# Patient Record
Sex: MALE | Race: BLACK OR AFRICAN AMERICAN | Employment: OTHER | URBAN - METROPOLITAN AREA
[De-identification: names, ages, dates, MRNs, and addresses within clinical notes are randomized per-mention and may not be internally consistent; named-entity substitution may affect disease eponyms.]

---

## 2017-01-23 ENCOUNTER — IP HISTORICAL/CONVERTED ENCOUNTER (OUTPATIENT)
Dept: OTHER | Age: 52
End: 2017-01-23

## 2017-05-16 ENCOUNTER — IP HISTORICAL/CONVERTED ENCOUNTER (OUTPATIENT)
Dept: OTHER | Age: 52
End: 2017-05-16

## 2017-12-31 ENCOUNTER — IP HISTORICAL/CONVERTED ENCOUNTER (OUTPATIENT)
Dept: OTHER | Age: 52
End: 2017-12-31

## 2018-01-29 ENCOUNTER — IP HISTORICAL/CONVERTED ENCOUNTER (OUTPATIENT)
Dept: OTHER | Age: 53
End: 2018-01-29

## 2018-03-21 ENCOUNTER — IP HISTORICAL/CONVERTED ENCOUNTER (OUTPATIENT)
Dept: OTHER | Age: 53
End: 2018-03-21

## 2018-03-27 ENCOUNTER — IP HISTORICAL/CONVERTED ENCOUNTER (OUTPATIENT)
Dept: OTHER | Age: 53
End: 2018-03-27

## 2018-05-18 ENCOUNTER — IP HISTORICAL/CONVERTED ENCOUNTER (OUTPATIENT)
Dept: OTHER | Age: 53
End: 2018-05-18

## 2018-10-13 ENCOUNTER — ED HISTORICAL/CONVERTED ENCOUNTER (OUTPATIENT)
Dept: OTHER | Age: 53
End: 2018-10-13

## 2018-11-23 ENCOUNTER — APPOINTMENT (OUTPATIENT)
Dept: GENERAL RADIOLOGY | Age: 53
End: 2018-11-23
Attending: EMERGENCY MEDICINE
Payer: MEDICAID

## 2018-11-23 ENCOUNTER — HOSPITAL ENCOUNTER (EMERGENCY)
Age: 53
Discharge: HOME | End: 2018-11-23
Attending: EMERGENCY MEDICINE
Payer: MEDICAID

## 2018-11-23 VITALS
RESPIRATION RATE: 18 BRPM | BODY MASS INDEX: 26.46 KG/M2 | TEMPERATURE: 98.2 F | HEIGHT: 71 IN | OXYGEN SATURATION: 98 % | DIASTOLIC BLOOD PRESSURE: 112 MMHG | HEART RATE: 91 BPM | SYSTOLIC BLOOD PRESSURE: 192 MMHG | WEIGHT: 189 LBS

## 2018-11-23 DIAGNOSIS — Z99.2 ACUTE HEMODIALYSIS PATIENT (HCC): Primary | ICD-10-CM

## 2018-11-23 DIAGNOSIS — J90 PLEURAL EFFUSION ON RIGHT: ICD-10-CM

## 2018-11-23 DIAGNOSIS — I15.9 SECONDARY HYPERTENSION: ICD-10-CM

## 2018-11-23 LAB
ANION GAP BLD CALC-SCNC: 16 MMOL/L (ref 10–20)
ANION GAP SERPL CALC-SCNC: 7 MMOL/L (ref 3–18)
BASOPHILS # BLD: 0 K/UL (ref 0–0.1)
BASOPHILS NFR BLD: 0 % (ref 0–2)
BUN BLD-MCNC: 36 MG/DL (ref 7–18)
BUN SERPL-MCNC: 43 MG/DL (ref 7–18)
BUN/CREAT SERPL: 10 (ref 12–20)
CA-I BLD-MCNC: 1.07 MMOL/L (ref 1.12–1.32)
CALCIUM SERPL-MCNC: 8.4 MG/DL (ref 8.5–10.1)
CHLORIDE BLD-SCNC: 108 MMOL/L (ref 100–108)
CHLORIDE SERPL-SCNC: 109 MMOL/L (ref 100–108)
CO2 BLD-SCNC: 22 MMOL/L (ref 19–24)
CO2 SERPL-SCNC: 25 MMOL/L (ref 21–32)
CREAT SERPL-MCNC: 4.25 MG/DL (ref 0.6–1.3)
CREAT UR-MCNC: 4.3 MG/DL (ref 0.6–1.3)
DIFFERENTIAL METHOD BLD: ABNORMAL
EOSINOPHIL # BLD: 0.4 K/UL (ref 0–0.4)
EOSINOPHIL NFR BLD: 4 % (ref 0–5)
ERYTHROCYTE [DISTWIDTH] IN BLOOD BY AUTOMATED COUNT: 15.3 % (ref 11.6–14.5)
GLUCOSE BLD STRIP.AUTO-MCNC: 140 MG/DL (ref 74–106)
GLUCOSE SERPL-MCNC: 105 MG/DL (ref 74–99)
HBV SURFACE AG SER QL: 0.36 INDEX
HBV SURFACE AG SER QL: NEGATIVE
HCT VFR BLD AUTO: 27 % (ref 36–48)
HCT VFR BLD CALC: 29 % (ref 36–49)
HGB BLD-MCNC: 9.1 G/DL (ref 13–16)
HGB BLD-MCNC: 9.9 G/DL (ref 12–16)
LYMPHOCYTES # BLD: 1 K/UL (ref 0.9–3.6)
LYMPHOCYTES NFR BLD: 11 % (ref 21–52)
MAGNESIUM SERPL-MCNC: 2.1 MG/DL (ref 1.6–2.6)
MCH RBC QN AUTO: 27.7 PG (ref 24–34)
MCHC RBC AUTO-ENTMCNC: 33.7 G/DL (ref 31–37)
MCV RBC AUTO: 82.1 FL (ref 74–97)
MONOCYTES # BLD: 0.6 K/UL (ref 0.05–1.2)
MONOCYTES NFR BLD: 7 % (ref 3–10)
NEUTS SEG # BLD: 6.8 K/UL (ref 1.8–8)
NEUTS SEG NFR BLD: 78 % (ref 40–73)
PLATELET # BLD AUTO: 329 K/UL (ref 135–420)
PMV BLD AUTO: 9.9 FL (ref 9.2–11.8)
POTASSIUM BLD-SCNC: 3.7 MMOL/L (ref 3.5–5.5)
POTASSIUM SERPL-SCNC: 4.1 MMOL/L (ref 3.5–5.5)
RBC # BLD AUTO: 3.29 M/UL (ref 4.7–5.5)
SODIUM BLD-SCNC: 142 MMOL/L (ref 136–145)
SODIUM SERPL-SCNC: 141 MMOL/L (ref 136–145)
WBC # BLD AUTO: 8.8 K/UL (ref 4.6–13.2)

## 2018-11-23 PROCEDURE — 80048 BASIC METABOLIC PNL TOTAL CA: CPT

## 2018-11-23 PROCEDURE — 93005 ELECTROCARDIOGRAM TRACING: CPT

## 2018-11-23 PROCEDURE — 86706 HEP B SURFACE ANTIBODY: CPT

## 2018-11-23 PROCEDURE — 80047 BASIC METABLC PNL IONIZED CA: CPT

## 2018-11-23 PROCEDURE — 83735 ASSAY OF MAGNESIUM: CPT

## 2018-11-23 PROCEDURE — 90935 HEMODIALYSIS ONE EVALUATION: CPT

## 2018-11-23 PROCEDURE — 71045 X-RAY EXAM CHEST 1 VIEW: CPT

## 2018-11-23 PROCEDURE — 99284 EMERGENCY DEPT VISIT MOD MDM: CPT

## 2018-11-23 PROCEDURE — 85025 COMPLETE CBC W/AUTO DIFF WBC: CPT

## 2018-11-23 PROCEDURE — 87340 HEPATITIS B SURFACE AG IA: CPT

## 2018-11-23 NOTE — PROGRESS NOTES
. 
Acute HEmodialysis flow sheet Patient information Name:Rogerio NOVAK [employer] MRN: [id number]      SQV:[id number]  [place] Room#H1/A Hospital: [place] [address] DX: needs dialysis [x]1st Time Acute  []Stat[x] Routine []Urgent []Chronic Unit  
       []Acute Room []Bedside  []ICU/CCU []ER Isolation Precautions: [x]Dialysis[] Airborne []Contact []Droplet []Reverse Special Considerations:    [] Blood Consent Verified  []N/A Allergies: 
[x] NKA  [] Reviewed No Known Allergies Code Status [x]Full Code [] DNR  []Other Diet:  Diabetic: []Yes [x]No 
  
 Hemodialysis Orders Physician: Sophie Camacho Dialyzer Revaclear 300 Duration 3 BFR: 350 DFR:800 Dialysate: K 3 CA 2.5 Na 140 Bicarb 35 Dry Weight: UF Goal: 2000 ml Heparin:  []Bolus   Units    []Hourly  Units      [x]None BP Tx:  []NS  200ml/Bolus    []Other     [x]N/A  
    Labs: Pre:  Post: [x]N/A Additional Orders: [x]N/A [x]Signed Treatment Consent   [x] Verified   [] Obtained Primary Nurse Report: First initial/ Last Name/Title Pre Dialysis: Lilliana Jimenes    Time: 6896 Access CATHETER ACCESS:  [] N/A  [x] RIGHT  [] LEFT  [] IJ  [x] SUBCL [] FEM [] First use X-ray  [] Tunnel     [] Non-Tunneled [x] No S/S infection  [] Redness [] Drainage  [] Cultured [] Swelling 
                       [] Pain  
                       [x] Medical Aseptic [] Prep Dressing Changed  
                       [] Clotted [] Patent []      Flows: [x] Good [] Poor [] Reversed If Access Problem Dr. Barbie Hope: [] Yes [] No    Date:    [x] N/A  
     GRAFT/FISTULA ACCESS:  [x] N/A  [] RIGHT  [] LEFT  [] UE  [] LE   
                       [] AVG  [] AVF [] BUTTONHOLE  [] +BRUIT/THRILL 
     [] MEDICAL ASEPTIC PREP [] No S/S infection  [] Redness [] Drainage  [] Cultured [] Swelling [] Pain Needle Gauge                              Length: If Access Problem Dr. Cunningham Guard: [] Yes [] No    Date:     [] N/A General Assessment LUNGS:  SaO2%  [x] Clear [] Coarse [] Crackles [] Wheezing  
            [] Diminished Location: [] RLL [] LLL [] RUL [] HANSA   
     COUGH:  [] Productive [] Dry [x] N/A  RESPIRATIONS: [x] Easy [] Labored THERAPY: [x] RA   [] NC     L/min    Mask: [] NRB [] Venti  O2%    
             [] Ventilator [] Intubated [] Trach [] BiPap [] CPap CARDIAC: [x] Regular [] Irregular [] Pericardial Rub [] JVD [] Monitored Rhythm: EDEMA: [x] None [] Generalized [] Facial [] Pedal [] UE [] LE 
           [] Pitting [] 1 [] 2 [] 3 [] 4    [] Right [] Left [] Bilateral  
    SKIN:    [x] Warm [] Hot [] Cold  [] Dry [] Pale [] Diaphoretic  
           [] Flushed [] Jaundiced [] Cyanotic [] Rash [] Weeping LOC:    [x] Alert  Oriented to: [x] Person [x] Place [x] Time  
          [] Confused [] Lethargic [] Medically sedated [] Non-responsive GI/ABDOMEN: [] Flat [x] Distended [x] Soft [] Firm [] Obese [] Diarrhea 
 [] Bowel Sounds     []Nausea [] Vomiting PAIN: [x] 0 [] 1 [] 2 [] 3 [] 4 [] 5 [] 6 [] 7 [] 8 [] 9 [] 10 Scale 1-10 Action/Follow Up MOBILITY: [] Amb [] Amb/Assist [x] Bed  [] Wheelchair CUrrent LABS HBsAg ONLY: Date Drawn  [] Negative [] Positive  [x] Unknown. HBsAb: Date Drawn  [] Susceptible <10 [] Immune ? 10 [x] Unknown Date of Current Labs:  
    
Lab Results Component Value Date/Time  Sodium 141 11/23/2018 02:30 PM  
 Potassium 4.1 11/23/2018 02:30 PM  
 Chloride 109 (H) 11/23/2018 02:30 PM  
 CO2 25 11/23/2018 02:30 PM  
 BUN 43 (H) 11/23/2018 02:30 PM  
 Creatinine 4.25 (H) 11/23/2018 02:30 PM  
 Calcium 8.4 (L) 11/23/2018 02:30 PM  
 Magnesium 2.1 11/23/2018 02:30 PM  
 HCT 27.0 (L) 11/23/2018 02:30 PM  
 HGB 9.1 (L) 11/23/2018 02:30 PM  
 PLATELET 000 75/85/7662 02:30 PM  
 INR 1.1 05/17/2011 12:02 AM  
  
Education Person Educated: [x] Patient [] Family [] Other Knowledge base: [] None [] Minimal [] Substantial   
     Barriers to learning  [x] None Preferred method of learning: [] Written [x] Oral [] Visual [] Hands on Topic: [] Access Care [] S&S of infection [] Fluid Management [] K+ 
               [x] Procedural    [] Albumin [] Medications [] Tx Options [] Transplant 
                [] Diet [] Other Teaching Tools: [x] Explain [] Demonstration [] Handout [] Teachback Ro/hemodiaylsis machine safety checks- before each treatment Machine Serial #11 [] # 1 [id number] [] # 2 [id number] [] # 3 [id number] [] # 4 [id number] [] # 5 [id number] [] # [phone] [] # [phone] Alarm Test: [x] Pass Time 8952       RO Serial # 11 
[] [id number] (Large dual station RO) [] # 1  [id number]  (Portable # 1) [] # 2  [id number]  (Portable # 2) [] # 3  [id number]  (Portable # 3) [] # 4  [id number]  (Portable # 4) [] # 5  [id number]  (Portable # 5) Lot #s: Dialyzer [id number] exp. Tubing [id number] exp. Saline -jt exp. [x] RO/Machine Log Complete    [x] Extracorporeal circuit Tested for integrity Dialysate: pH 7.4 Temp. 36  Conductivity: Meter 14 HD Machine 14  
chlorine testing- before each treatment and every 4 hours Total Chlorine: [x] Less than 0.1 ppm Time: 1545 2nd Check Time:  
(If greater than 0.1 ppm from Primary then every 30 minutes from Secondary) treatment Iniation-with dialysis precautions [x] All Connections Secured   [x] Saline Line Double Clamped    
[x] Venous Parameters Set    [x] Arterial Parameters Set    [x] Prime Given 250 ml            [x] Air Foam Detector Engaged Cooper Nurse Signature/Title_Philip P Long__ Pre-Treatment Time 1545 B/P 172/103 HR 93 Temp. 98.2 Resp Rate 18 Pre Wt  
UF Calculations:   
Wt to lose:2000 ml(+) Oral:  ml(+) IV Meds/Fluids/Blood prods  ml(+) Prime/Rinse 500 ml 
(=)Total UF Goal 2500 mL Scale Type:[] Bed scale [] Sling Scale [] Wheel Chair Scale [x]  Not Ordered [] Unable to obtain pt on stretcher Tx Initiation Note: right IJ catheter accessed and treatment started. [x] Time Out/Safety Check  Time: 8105 [facility] Signature/Title_Devonte P Mane INTRADIALYTIC MONITORING 
(SEE ATTACHED FLOWSHEET) POST TREATMENT Time 1915 B/P 192/112 HR 91 Temp 98.2 Resp. Rate 18 Post wt Time Medication Dose Volume Route Initials [facility] Signatures Title Initials Time 52 [facility] RN [address] 0172 [facility] Signature/Title:_Devonte DEVRIES Long_ Dialyzer cleared: [] Good [] Fair [] Poor    Blood Processed 60 Liters Net UF Removed 3000 mL Post Tx Access: AVF/AVG: Bleeding Stop                   Art. min Varghese min []+bruit/thrill Catheter: Locking Solution hejparin     Art. 1.6 ml Varghese 1.6 ml 
 
Post Assessment:  Skin: [x]Warm []Dry []Diaphoretic []Flushed [] Pale []Cyanotic Lungs: [x]Clear []Coarse []Crackles []Wheezing Cardiac: [x]Regular []Irregular  []Monitored rhythm Edema: [x]None []General []Facial []Pedal  []UE []LE []RIGHT []LEFT    []Bilateral  
   Pain: [x]0 []1[]2 []3 []4 []5 []6 []7 []8 []9 []10  
POST Tx Note: removed 3 liters patient tolerated treatment returned to ED Primary Nurse Report: First initial/Last name/Title Post Dialysis:PORTER Hernandez RN_         Time:_1915_ Abbreviations: AVG-arterial venous graft, AVF-arterial venous fistula, IJ-Internal Jugular, Subcl-Subclavian, Fem-Femoral, Tx-treatment, AP/HR-apical heart rate, DFR-dialysate flow rate, BFR-blood flow rate, AP-arterial pressure, -venous pressure, UF-ultrafiltrate, TMP-transmembrane pressure, Varghese-Venous, Art-Arterial, RO-Reverse Osmosis

## 2018-11-23 NOTE — ANCILLARY DISCHARGE INSTRUCTIONS
Dr. Merlene Aden spoke with Dr. Ced Arcos,  Office will follow up with patient, if unable to schedule follow up by Monday, patient is to return to the ED on Monday 11/26/18 for dialysis. Patient is aware.

## 2018-11-23 NOTE — ED TRIAGE NOTES
Patient states he was released from incarceration on 11/5, has not been set up with a dialysis center yet, was at [facility] on Wednesday for dialysis and was told to go to [facility] on Friday as this is closer to his home, patient needs follow up for dialysis placement

## 2018-11-23 NOTE — DISCHARGE INSTRUCTIONS
Kidney Dialysis: Care Instructions  Your Care Instructions    Dialysis is a process that filters wastes from the blood when your kidneys can no longer do the job. It is not a cure, but it can help you live longer and feel better. It is a lifesaving treatment when you have kidney failure. Normal kidneys work 24 hours a day to clean wastes from your blood. Your kidneys are not able to do this job, so a process called dialysis will do some of the work for your kidneys. You and your doctor will decide which type of dialysis you should have. Peritoneal dialysis uses the lining of your belly (peritoneum) to filter your blood. You can do it at home, on a daily basis. Hemodialysis uses a man-made filter called a dialyzer to clean your blood. Most people need to go to a hospital or clinic 3 days a week for several hours each time. Sometimes hemodialysis can be done at home. It is normal to have questions about your treatment, and you have a right to know what is happening to you. Learning about dialysis can help you take an active role in your treatment. Dialysis does not cure kidney disease, but it can help you live longer and feel better. You will need to follow your diet and treatment schedule carefully. Follow-up care is a key part of your treatment and safety. Be sure to make and go to all appointments, and call your doctor if you are having problems. It's also a good idea to know your test results and keep a list of the medicines you take. What do you need to know about peritoneal dialysis? Peritoneal dialysis uses the lining of your belly (or peritoneal membrane) to filter your blood. Before you can begin peritoneal dialysis, your doctor will need to place a thin tube called a catheter in your belly. This is the dialysis access. · Peritoneal dialysis can be done at home or in any clean place. You may be able to do it while you sleep. · You can do it by yourself.  You do not have to rely on help from others. · You can do it at the times you choose as long as you do the right number of treatments. · It has to be done every day of the week. · Some people find it hard to do all the required steps. · It increases your chance for a serious infection of the lining of the belly (peritoneum). What do you need to know about hemodialysis? Hemodialysis uses a man-made membrane called a dialyzer to clean your blood. You are connected to the dialyzer by tubes attached to your blood vessels. Before you start hemodialysis, your doctor will create a site where the blood can flow in and out of your body during your dialysis sessions. This site is called the vascular access. It may be a fistula, made by connecting an artery and a vein. Or it may be a graft, which is a tube implanted under your skin. · Hemodialysis is done mainly by trained health workers who can watch for any problems. · It allows you to be in contact with other people having dialysis. This can help provide emotional support. · You can schedule your treatments in the evenings so you can keep working. · You may be able to do home hemodialysis, which gives you more control over your schedule. · It usually needs to be done on a set schedule 3 times a week. · It can cause side effects. The most common side effects are low blood pressure and muscle cramps. These can often be treated easily. · It requires needle sticks during every treatment, which bothers some people. Others get used to it and even do the needle sticks themselves. How can you care for yourself at home? · Be sure to have all of your dialysis sessions. Do not try to shorten or skip your sessions. You have a better chance of a longer and healthier life by getting your full treatment. · Your doctor or health care team will show you the steps you need to go through each day before, during, and after dialysis. Be sure to follow these steps.  If you do not understand a step, talk to your team.  · Your doctor and dietitian will help you design menus that follow your diet. Be sure to follow your diet guidelines. ? You will need to limit fluids and certain foods that contain salt (sodium), potassium, and phosphorus. ? You may need to follow a heart-healthy diet to keep the fat (cholesterol) in your blood under control. ? You may need higher levels of protein in your diet. · Your doctor may recommend certain vitamins. But do not take any other medicine, including over-the-counter medicines, vitamins, and herbal products, without talking to your doctor first.  · Do not smoke. Smoking raises your risk of many health problems, including more kidney damage. If you need help quitting, talk to your doctor about stop-smoking programs and medicines. These can increase your chances of quitting for good. · Do not take ibuprofen (Advil, Motrin), naproxen (Aleve), or similar medicines, unless your doctor tells you to. These medicines may make kidney problems worse. When should you call for help? Call your doctor now or seek immediate medical care if:    · You have a fever.     · You are dizzy or lightheaded, or you feel like you may faint.     · You are confused or cannot think clearly.     · You have new or worse nausea or vomiting.     · You have new or more blood in your urine.     · You have new swelling.    Watch closely for changes in your health, and be sure to contact your doctor if:    · You do not get better as expected. Where can you learn more? Go to http://nato-kenneth.info/. Enter L968 in the search box to learn more about \"Kidney Dialysis: Care Instructions. \"  Current as of: March 15, 2018  Content Version: 11.8  © 1399-6375 [employer]. Care instructions adapted under license by [employer] (which disclaims liability or warranty for this information).  If you have questions about a medical condition or this instruction, always ask your healthcare professional. [address] any warranty or liability for your use of this information. Learning About a Pleural Effusion  What is it? A pleural effusion (say \"PLER-jose jq-ZVJM-rwui\") is the buildup of fluid in the space between tissues lining the lungs and the chest wall. Because of the fluid buildup, the lungs may not be able to expand completely. This can make it hard to breathe. A pleural empyema (say \"kt-vv-QF-muh\") is a problem that can happen with pleural effusion. Bacteria or other infections cause pus to form in the pleural fluid. But most pleural effusions don't become infected. What causes it? A pleural effusion has many causes. They include pneumonia, cancer, inflammation of the tissues around the lungs, and heart failure. What are the symptoms? Symptoms of a pleural effusion may include:  · Trouble breathing. · Shortness of breath. · Chest pain. · Fever. · A cough. A minor pleural effusion may not cause any symptoms. How is it diagnosed? A pleural effusion is usually diagnosed with an X-ray and a physical exam. The doctor listens to the airflow in your lungs. How is it treated? A pleural effusion can be treated by removing fluid from the space between the tissues around the lungs. This is done with a needle that's put into the chest (thoracentesis). A small amount of the fluid may be sent to a lab to find out what is causing the buildup of fluid. Removing the fluid may help to relieve symptoms, such as shortness of breath and chest pain. It can help the lungs to expand more fully. If the pleural effusion doesn't get better, a catheter may be placed in the chest. This is a flexible tube that allows fluid to drain from the lungs. The catheter stays in the chest until the doctor removes it. Some people may get a treatment that removes the fluid and then puts a medicine into the chest cavity. This helps to prevent too much fluid from building up again.   A minor pleural effusion often goes away on its own. Doctors may need to treat the condition that is causing the pleural effusion. For example, you may get medicines to treat pneumonia or congestive heart failure. When the condition is treated, the effusion usually goes away. For a pleural empyema, the pus needs to be drained. It may drain from a flexible tube placed in the chest. Or you may have surgery to drain it. You also will get antibiotics. Follow-up care is a key part of your treatment and safety. Be sure to make and go to all appointments, and call your doctor if you are having problems. It's also a good idea to know your test results and keep a list of the medicines you take. Where can you learn more? Go to http://nato-kenneth.info/. Enter A920 in the search box to learn more about \"Learning About a Pleural Effusion. \"  Current as of: June 18, 2018  Content Version: 11.8  © 6299-1954 [employer]. Care instructions adapted under license by [employer] (which disclaims liability or warranty for this information). If you have questions about a medical condition or this instruction, always ask your healthcare professional. [place] [address] any warranty or liability for your use of this information. Kidney Dialysis: Care Instructions  Your Care Instructions    Dialysis is a process that filters wastes from the blood when your kidneys can no longer do the job. It is not a cure, but it can help you live longer and feel better. It is a lifesaving treatment when you have kidney failure. Normal kidneys work 24 hours a day to clean wastes from your blood. Your kidneys are not able to do this job, so a process called dialysis will do some of the work for your kidneys. You and your doctor will decide which type of dialysis you should have. Peritoneal dialysis uses the lining of your belly (peritoneum) to filter your blood.  You can do it at home, on a daily basis. Hemodialysis uses a man-made filter called a dialyzer to clean your blood. Most people need to go to a hospital or clinic 3 days a week for several hours each time. Sometimes hemodialysis can be done at home. It is normal to have questions about your treatment, and you have a right to know what is happening to you. Learning about dialysis can help you take an active role in your treatment. Dialysis does not cure kidney disease, but it can help you live longer and feel better. You will need to follow your diet and treatment schedule carefully. Follow-up care is a key part of your treatment and safety. Be sure to make and go to all appointments, and call your doctor if you are having problems. It's also a good idea to know your test results and keep a list of the medicines you take. What do you need to know about peritoneal dialysis? Peritoneal dialysis uses the lining of your belly (or peritoneal membrane) to filter your blood. Before you can begin peritoneal dialysis, your doctor will need to place a thin tube called a catheter in your belly. This is the dialysis access. · Peritoneal dialysis can be done at home or in any clean place. You may be able to do it while you sleep. · You can do it by yourself. You do not have to rely on help from others. · You can do it at the times you choose as long as you do the right number of treatments. · It has to be done every day of the week. · Some people find it hard to do all the required steps. · It increases your chance for a serious infection of the lining of the belly (peritoneum). What do you need to know about hemodialysis? Hemodialysis uses a man-made membrane called a dialyzer to clean your blood. You are connected to the dialyzer by tubes attached to your blood vessels. Before you start hemodialysis, your doctor will create a site where the blood can flow in and out of your body during your dialysis sessions.  This site is called the vascular access. It may be a fistula, made by connecting an artery and a vein. Or it may be a graft, which is a tube implanted under your skin. · Hemodialysis is done mainly by trained health workers who can watch for any problems. · It allows you to be in contact with other people having dialysis. This can help provide emotional support. · You can schedule your treatments in the evenings so you can keep working. · You may be able to do home hemodialysis, which gives you more control over your schedule. · It usually needs to be done on a set schedule 3 times a week. · It can cause side effects. The most common side effects are low blood pressure and muscle cramps. These can often be treated easily. · It requires needle sticks during every treatment, which bothers some people. Others get used to it and even do the needle sticks themselves. How can you care for yourself at home? · Be sure to have all of your dialysis sessions. Do not try to shorten or skip your sessions. You have a better chance of a longer and healthier life by getting your full treatment. · Your doctor or health care team will show you the steps you need to go through each day before, during, and after dialysis. Be sure to follow these steps. If you do not understand a step, talk to your team.  · Your doctor and dietitian will help you design menus that follow your diet. Be sure to follow your diet guidelines. ? You will need to limit fluids and certain foods that contain salt (sodium), potassium, and phosphorus. ? You may need to follow a heart-healthy diet to keep the fat (cholesterol) in your blood under control. ? You may need higher levels of protein in your diet. · Your doctor may recommend certain vitamins. But do not take any other medicine, including over-the-counter medicines, vitamins, and herbal products, without talking to your doctor first.  · Do not smoke.  Smoking raises your risk of many health problems, including more kidney damage. If you need help quitting, talk to your doctor about stop-smoking programs and medicines. These can increase your chances of quitting for good. · Do not take ibuprofen (Advil, Motrin), naproxen (Aleve), or similar medicines, unless your doctor tells you to. These medicines may make kidney problems worse. When should you call for help? Call your doctor now or seek immediate medical care if:    · You have a fever.     · You are dizzy or lightheaded, or you feel like you may faint.     · You are confused or cannot think clearly.     · You have new or worse nausea or vomiting.     · You have new or more blood in your urine.     · You have new swelling.    Watch closely for changes in your health, and be sure to contact your doctor if:    · You do not get better as expected. Where can you learn more? Go to http://nato-kenneth.info/. Enter X389 in the search box to learn more about \"Kidney Dialysis: Care Instructions. \"  Current as of: March 15, 2018  Content Version: 11.8  © 7223-9819 Healthwise, Incorporated. Care instructions adapted under license by [employer] (which disclaims liability or warranty for this information). If you have questions about a medical condition or this instruction, always ask your healthcare professional. [address] any warranty or liability for your use of this information.

## 2018-11-23 NOTE — ED PROVIDER NOTES
EMERGENCY DEPARTMENT HISTORY AND PHYSICAL EXAM 
 
1:30 PM 
 
 
Date: 11/23/2018 Patient Name: Dalila Galvan History of Presenting Illness Chief Complaint Patient presents with  
 Other History Provided By: Patient Chief Complaint: Missed dialysis Duration:  Days Timing: progressive over 3 weeks Location: n/a Quality: n/a Severity: mild and patient is concerned of getting worse Modifying Factors: No modifying or aggravating factors were reported. Associated Symptoms: denies any other associated signs or symptoms Additional History (Context): 1:35 PM Dalila Galvan is a 47 y.o. male with h/o diabetes, HTN, CAD, multiple stents, multiple alvarado, and multiple heart attacks who presents to ED for evaluation after he has missed dialysis. He was incarcirated for three years and released on Nov 5. He missed dialysis for two weeks, and was dialysed two days ago at [facility]. He presents to be sure that he does not need another treatment. He has been on dialysis for 11 months. He denies any current symptoms. Occasionally smokes, denies etoh use. He has chronic right sided weakness after a stroke deficit. No modifying or aggravating factors were reported. No other concerns or symptoms at this time. PCP: Hector Walden MD 
 
Current Outpatient Medications Medication Sig Dispense Refill  pregabalin (LYRICA) 100 mg capsule TAKE 1 CAPSULE BY MOUTH THREE TIMES DAILY 90 capsule 5  clopidogrel (PLAVIX) 75 mg tablet Take 1 tablet by mouth daily. 30 tablet 3  
 lisinopril (PRINIVIL, ZESTRIL) 40 mg tablet Take 1 tablet by mouth daily. 30 tablet 3  chlorthalidone (HYGROTEN) 25 mg tablet Take  by mouth daily.  atorvastatin (LIPITOR) 80 mg tablet Take 80 mg by mouth daily.  carvedilol (COREG) 25 mg tablet Take 1 Tab by mouth two (2) times daily (with meals). For blood pressure/heart.  Replaces metoprolol 60 Tab 10  
 insulin glargine (LANTUS) 100 unit/mL injection 70 Units by SubCUTAneous route two (2) times a day. 5 Vial 11  
 fenofibrate (LOFIBRA) 54 mg tablet Take 54 mg by mouth daily.  aspirin delayed-release (ASPIR-81) 81 mg tablet Take 81 mg by mouth daily.  amlodipine (NORVASC) 10 mg tablet Take 10 mg by mouth daily.  insulin lispro (HUMALOG) 100 unit/mL injection 50 Units by SubCUTAneous route three (3) times daily (with meals). Past History Past Medical History: 
Past Medical History:  
Diagnosis Date  CAD (coronary artery disease) MI x3, 15 stents. 2008  Diabetes ([address] Dx in late 25s  GERD (gastroesophageal reflux disease)  Hypertension Past Surgical History: 
Past Surgical History:  
Procedure Laterality Date  CARDIAC SURG PROCEDURE UNLIST  2008 Stents (15)  HX ORTHOPAEDIC    
 left shoulder manipulation and bone spur removal  
 
 
Family History: 
Family History Problem Relation Age of Onset  Diabetes Mother  Diabetes Father Social History: 
Social History Tobacco Use  Smoking status: Former Smoker Packs/day: 0.50 Years: 13.00 Pack years: 6.50  Smokeless tobacco: Never Used Substance Use Topics  Alcohol use: No  
 Drug use: No  
 
 
Allergies: 
No Known Allergies Review of Systems Review of Systems Constitutional: Negative for activity change, fatigue and fever. HENT: Negative for congestion and rhinorrhea. Eyes: Negative for visual disturbance. Respiratory: Negative for shortness of breath. Cardiovascular: Negative for chest pain and palpitations. Gastrointestinal: Negative for abdominal pain, diarrhea, nausea and vomiting. Genitourinary: Negative for dysuria and hematuria. Musculoskeletal: Negative for back pain. Skin: Negative for rash. Neurological: Negative for dizziness, weakness and light-headedness. Psychiatric/Behavioral: Negative for agitation. All other systems reviewed and are negative.  
 
 
Physical Exam  
 Visit Vitals BP (!) 199/116 Pulse 90 Temp 98.2 °F (36.8 °C) (Oral) Resp 18 Ht 5' 11\" (1.803 m) Wt 85.7 kg (189 lb) SpO2 98% BMI 26.36 kg/m² Physical Exam  
Constitutional: He appears well-developed and well-nourished. Right chest catheter without sign of infection. Appropriately tunneled. HENT:  
Head: Normocephalic and atraumatic. Eyes: Conjunctivae are normal. Pupils are equal, round, and reactive to light. Neck: Normal range of motion. Neck supple. Cardiovascular: Normal rate and regular rhythm. Pulmonary/Chest: Effort normal and breath sounds normal.  
Abdominal: Soft. Bowel sounds are normal.  
Musculoskeletal: Normal range of motion. Lymphadenopathy:  
  He has no cervical adenopathy. Neurological: He is alert. Patient has right arm and leg weakness, chronic, and uses a walker for it. Skin: Skin is warm. Psychiatric: He has a normal mood and affect. Diagnostic Study Results Labs - Recent Results (from the past 12 hour(s)) EKG, 12 LEAD, INITIAL Collection Time: 11/23/18 12:51 PM  
Result Value Ref Range Ventricular Rate 100 BPM  
 Atrial Rate 100 BPM  
 P-R Interval 182 ms QRS Duration 94 ms Q-T Interval 406 ms QTC Calculation (Bezet) 523 ms Calculated P Axis 63 degrees Calculated R Axis 16 degrees Calculated T Axis 52 degrees Diagnosis Sinus rhythm with frequent premature ventricular complexes Possible Left atrial enlargement Low voltage QRS Cannot rule out Anterior infarct , age undetermined Prolonged QT Abnormal ECG No previous ECGs available POC CHEM8 Collection Time: 11/23/18  1:03 PM  
Result Value Ref Range CO2, POC 22 19 - 24 MMOL/L Glucose,  (H) 74 - 106 MG/DL  
 BUN, POC 36 (H) 7 - 18 MG/DL Creatinine, POC 4.3 (H) 0.6 - 1.3 MG/DL  
 GFRAA, POC 18 (L) >60 ml/min/1.73m2 GFRNA, POC 15 (L) >60 ml/min/1.73m2 Sodium,  136 - 145 MMOL/L  Potassium, POC 3.7 3.5 - 5.5 MMOL/L  
 Calcium, ionized (POC) 1.07 (L) 1.12 - 1.32 mmol/L Chloride,  100 - 108 MMOL/L Anion gap, POC 16 10 - 20 Hematocrit, POC 29 (L) 36 - 49 % Hemoglobin, POC 9.9 (L) 12 - 16 G/DL  
CBC WITH AUTOMATED DIFF Collection Time: 11/23/18  2:30 PM  
Result Value Ref Range WBC 8.8 4.6 - 13.2 K/uL  
 RBC 3.29 (L) 4.70 - 5.50 M/uL HGB 9.1 (L) 13.0 - 16.0 g/dL HCT 27.0 (L) 36.0 - 48.0 % MCV 82.1 74.0 - 97.0 FL  
 MCH 27.7 24.0 - 34.0 PG  
 MCHC 33.7 31.0 - 37.0 g/dL  
 RDW 15.3 (H) 11.6 - 14.5 % PLATELET 844 046 - 190 K/uL MPV 9.9 9.2 - 11.8 FL  
 NEUTROPHILS 78 (H) 40 - 73 % LYMPHOCYTES 11 (L) 21 - 52 % MONOCYTES 7 3 - 10 % EOSINOPHILS 4 0 - 5 % BASOPHILS 0 0 - 2 %  
 ABS. NEUTROPHILS 6.8 1.8 - 8.0 K/UL  
 ABS. LYMPHOCYTES 1.0 0.9 - 3.6 K/UL  
 ABS. MONOCYTES 0.6 0.05 - 1.2 K/UL  
 ABS. EOSINOPHILS 0.4 0.0 - 0.4 K/UL  
 ABS. BASOPHILS 0.0 0.0 - 0.1 K/UL  
 DF AUTOMATED METABOLIC PANEL, BASIC Collection Time: 11/23/18  2:30 PM  
Result Value Ref Range Sodium 141 136 - 145 mmol/L Potassium 4.1 3.5 - 5.5 mmol/L Chloride 109 (H) 100 - 108 mmol/L  
 CO2 25 21 - 32 mmol/L Anion gap 7 3.0 - 18 mmol/L Glucose 105 (H) 74 - 99 mg/dL BUN 43 (H) 7.0 - 18 MG/DL Creatinine 4.25 (H) 0.6 - 1.3 MG/DL  
 BUN/Creatinine ratio 10 (L) 12 - 20 GFR est AA 18 (L) >60 ml/min/1.73m2 GFR est non-AA 15 (L) >60 ml/min/1.73m2 Calcium 8.4 (L) 8.5 - 10.1 MG/DL MAGNESIUM Collection Time: 11/23/18  2:30 PM  
Result Value Ref Range Magnesium 2.1 1.6 - 2.6 mg/dL Radiologic Studies -  
XR CHEST PORT Final Result Medical Decision Making I am the first provider for this patient. I reviewed the vital signs, available nursing notes, past medical history, past surgical history, family history and social history. Vital Signs-Reviewed the patient's vital signs. Pulse Oximetry Analysis -  100% on room air - stable EKG: Interpreted by the EP. Time Interpreted: 12:51 PM 
 Rate: 100 Rhythm: Normal Sinus Rhythm Interpretation: QTC of 523 ms, poor R wave progression. Q in anterior wall, no sign of acute ischemia Records Reviewed: Nursing Notes and Old Medical Records (Time of Review: 1:30 PM) ED Course: Progress Notes, Reevaluation, and Consults: 
2:50 PM Review of labs shows hemoglobin of 9.1 which is lower than his prior in 2014. No rectal bleeding per patient. Patient's K is 3.7. He does have right sided pleural effusion, which he was unaware of. Will speak with nephrologist as pt has no respiratory distress to see if he can be dialyzed today. May be secondary to poor dialysis. He shows no signs or symptoms of pneumonia, vitals are normal. No signs of acute intervention. Consult:  Discussed care with Dr. Celestine Meyer (nephrologist) Standard discussion; including history of patients chief complaint, available diagnostic results, and treatment course. Dialyze pt today and have a plan for outpatient dialysis. He is not concerned about right sided pleural effusion. 3:20 PM, 11/23/2018  
 
3:29 PM Discussed with the patient in detail with  Fatuma Pat present that the patient is to have dialysis today. If not particular set up is made for the future, he is to return to the ED to get instructions from case management on Monday. He verbalized agreement. Provider Notes (Medical Decision Making): Patient presents with missed dialysis over 2 weeks, dialyzed 2 days ago. Concerned for electrolyte imbalance. No respiratory distress, No hypoxia. Will check CXR, if labs normal, will discuss with nephrology for management of patients care. Vitals review and /104 is noted. Diagnosis Clinical Impression: 1. Acute hemodialysis patient ([address] 2. Secondary hypertension 3. Pleural effusion on right Disposition: 4:00 PM : Pt care transferred to Dr. Nery Van  ,ED provider.  History of patient complaint(s), available diagnostic reports and current treatment plan has been discussed thoroughly. Bedside rounding on patient occured : yes . Intended disposition of patient : Home Pending diagnostics reports and/or labs (please list): Pending dialysis completion and reevaluation. Discharge papers will be written. SIGN OUT: 
4:53 PM 
Patient's presentation, labs/imaging and plan of care was reviewed with NATHAN Jones as part of sign out. His assistance in completion of this plan is greatly appreciated but it should be noted that I will be the provider of record for this patient. Rabia Oshea MD 
 
 
 
 
Follow-up Information Follow up With Specialties Details Why Contact Info Stevie Malone MD Internal Medicine On 12/5/2018 at 12:15 pm [id number] [facility] [address] [id number] 
[phone] Andrei Hanson MD Nephrology Call on 11/26/2018 at 8:00 am [facility] [address] [facility] [id number] 
[phone] [address] EMERGENCY DEPT Emergency Medicine  If symptoms worsen [address] [facility] 
[phone] Medication List  
  
ASK your doctor about these medications ASPIR-81 81 mg tablet Generic drug:  aspirin delayed-release 
  
atorvastatin 80 mg tablet Commonly known as:  LIPITOR 
  
carvedilol 25 mg tablet Commonly known as:  Heriberto Fling Take 1 Tab by mouth two (2) times daily (with meals). For blood pressure/heart. Replaces metoprolol 
  
chlorthalidone 25 mg tablet Commonly known as:  HYGROTEN 
  
clopidogrel 75 mg Tab Commonly known as:  PLAVIX Take 1 tablet by mouth daily. fenofibrate 54 mg tablet Commonly known as:  LOFIBRA HumaLOG U-100 Insulin 100 unit/mL injection Generic drug:  insulin lispro 
  
insulin glargine 100 unit/mL injection Commonly known as:  LANTUS 
70 Units by SubCUTAneous route two (2) times a day. lisinopril 40 mg tablet Commonly known as:  Carmen Fossa Take 1 tablet by mouth daily.  
  
NORVASC 10 mg tablet Generic drug:  amLODIPine 
  
pregabalin 100 mg capsule Commonly known as:  May Laughter TAKE 1 CAPSULE BY MOUTH THREE TIMES DAILY 
  
  
 
_______________________________ Scribe Attestation Angelina Sales MD acting as a scribe for and in the presence of Vitor Mendosa MD     
November 23, 2018 at 4:54 PM 
    
Provider Attestation:     
I personally performed the services described in the documentation, reviewed the documentation, as recorded by the scribe in my presence, and it accurately and completely records my words and actions. November 23, 2018 at 4:54 PM - Vitor Mendosa MD   
 
 
_______________________________

## 2018-11-23 NOTE — ED NOTES
5:12 PM :Pt care assumed from Dr. Kain Burrell , ED provider. Pt complaint(s), current treatment plan, progression and available diagnostic results have been discussed thoroughly. Rounding occurred: no Intended Disposition: TBD Pending diagnostic reports and/or labs (please list): Pending Dialysis completion and re-evaluation. 6:00 PM : Pt care transferred to Dr. Satnam Calzada  ,ED provider. History of patient complaint(s), available diagnostic reports and current treatment plan has been discussed thoroughly. Bedside rounding on patient occured : yes . Intended disposition of patient : TBD Pending diagnostics reports and/or labs (please list): Pending Dialysis completion and re-evaluation Scribe Attestation Rosita Vaibhav acting as a scribe for and in the presence of Hi Carmen MD     
November 23, 2018 at 5:12 PM 
    
Provider Attestation:     
I personally performed the services described in the documentation, reviewed the documentation, as recorded by the scribe in my presence, and it accurately and completely records my words and actions.  November 23, 2018 at 5:12 PM - Hi Carmen MD

## 2018-11-23 NOTE — ED NOTES
Pt states he was released from longterm on Nov. 5th and has not had dialysis since. Pt states his dialysis was set up in [address] and he is unable to get there.

## 2018-11-24 NOTE — ED NOTES
6:00 PM :Pt care assumed from Birtha Eisenmenger , ED provider. Pt complaint(s), current treatment plan, progression and available diagnostic results have been discussed thoroughly. Rounding occurred: Yes Intended Disposition: TBD Pending diagnostic reports and/or labs (please list): pending dialysis completion and reevaluation. Progress Notes:  
Patient returned from dialysis and is having no complaints. He was set-up with a PMD and Nephrologist, but if he is unable to obtain HD on Monday, he is to return to the ER for dialysis. Patient comfortable with the plan. Disposition:  
Home Attestations: 
 
Scribe Attestation Ivette Avila acting as a scribe for and in the presence of Marisol Davis MD     
November 23, 2018 at 7:12 PM 
    
Provider Attestation:     
I personally performed the services described in the documentation, reviewed the documentation, as recorded by the scribe in my presence, and it accurately and completely records my words and actions.  November 23, 2018 at 7:12 PM - Marisol Davis MD

## 2018-11-25 LAB
ATRIAL RATE: 100 BPM
CALCULATED P AXIS, ECG09: 63 DEGREES
CALCULATED R AXIS, ECG10: 16 DEGREES
CALCULATED T AXIS, ECG11: 52 DEGREES
DIAGNOSIS, 93000: NORMAL
P-R INTERVAL, ECG05: 182 MS
Q-T INTERVAL, ECG07: 406 MS
QRS DURATION, ECG06: 94 MS
QTC CALCULATION (BEZET), ECG08: 523 MS
VENTRICULAR RATE, ECG03: 100 BPM

## 2018-11-26 ENCOUNTER — HOSPITAL ENCOUNTER (EMERGENCY)
Age: 53
Discharge: HOME | End: 2018-11-26
Attending: EMERGENCY MEDICINE
Payer: MEDICAID

## 2018-11-26 VITALS
BODY MASS INDEX: 26.08 KG/M2 | RESPIRATION RATE: 18 BRPM | WEIGHT: 187 LBS | SYSTOLIC BLOOD PRESSURE: 139 MMHG | TEMPERATURE: 98.5 F | OXYGEN SATURATION: 97 % | DIASTOLIC BLOOD PRESSURE: 89 MMHG | HEART RATE: 87 BPM

## 2018-11-26 DIAGNOSIS — Z99.2 HEMODIALYSIS PATIENT (HCC): Primary | ICD-10-CM

## 2018-11-26 DIAGNOSIS — R06.02 SOB (SHORTNESS OF BREATH): ICD-10-CM

## 2018-11-26 LAB
HBV SURFACE AB SER QL IA: NEGATIVE
HBV SURFACE AB SERPL IA-ACNC: <3.1 MIU/ML
HEP BS AB COMMENT,HBSAC: ABNORMAL

## 2018-11-26 PROCEDURE — 90935 HEMODIALYSIS ONE EVALUATION: CPT

## 2018-11-26 PROCEDURE — 99285 EMERGENCY DEPT VISIT HI MDM: CPT

## 2018-11-26 RX ORDER — SODIUM CHLORIDE 9 MG/ML
50 INJECTION, SOLUTION INTRAVENOUS
Status: DISCONTINUED | OUTPATIENT
Start: 2018-11-26 | End: 2018-11-26 | Stop reason: HOSPADM

## 2018-11-26 RX ORDER — HEPARIN SODIUM 1000 [USP'U]/ML
1000 INJECTION, SOLUTION INTRAVENOUS; SUBCUTANEOUS
Status: DISCONTINUED | OUTPATIENT
Start: 2018-11-26 | End: 2018-11-26 | Stop reason: HOSPADM

## 2018-11-26 RX ORDER — HEPARIN SODIUM 5000 [USP'U]/ML
1000 INJECTION, SOLUTION INTRAVENOUS; SUBCUTANEOUS
Status: ACTIVE | OUTPATIENT
Start: 2018-11-26 | End: 2018-11-26

## 2018-11-26 NOTE — PROGRESS NOTES
. 
Acute HEmodialysis flow sheet Patient information Name:Rogerio NOVAK [employer] MRN: [id number]      ISL:[id number]  [place]# ETSO#[id number] Hospital: [place] [address] DX: Dialysis []1st Time Acute  []Stat[x] Routine []Urgent []Chronic Unit  
       []Acute Room []Bedside  []ICU/CCU []ER Isolation Precautions: [x]Dialysis[] Airborne []Contact []Droplet []Reverse Special Considerations:    [] Blood Consent Verified  [x]N/A Allergies: 
[x] NKA  [] Reviewed No Known Allergies Code Status [x]Full Code [] DNR  []Other Diet: renal Diabetic: [x]Yes []No 
  
 Hemodialysis Orders Physician: Giselle Willis Dialyzer Revaclear 300 Duration 3 BFR: 350 DFR:800 Dialysate: K 3 CA 2.5 Na 140 Bicarb 35 Dry Weight: UF Goal: 3000 ml Heparin:  []Bolus   Units    []Hourly  Units      [x]None BP Tx:  []NS  200ml/Bolus    []Other     [x]N/A  
    Labs: Pre:  Post: [x]N/A Additional Orders: [x]N/A [x]Signed Treatment Consent   [x] Verified   [] Obtained Primary Nurse Report: First initial/ Last Name/Title Pre Dialysis: Dez Ndiaye    Time: 3876 Access CATHETER ACCESS:  [] N/A  [x] RIGHT  [] LEFT  [x] IJ  [] SUBCL [] FEM [] First use X-ray  [x] Tunnel     [] Non-Tunneled  
                       [] No S/S infection  [] Redness [] Drainage  [] Cultured [] Swelling 
                       [] Pain  
                       [x] Medical Aseptic [] Prep Dressing Changed  
                       [] Clotted [x] Patent []      Flows: [x] Good [] Poor [] Reversed If Access Problem Dr. Dangelo Bolanos: [] Yes [] No    Date:    [x] N/A  
     GRAFT/FISTULA ACCESS:  [x] N/A  [] RIGHT  [] LEFT  [] UE  [] LE   
                       [] AVG  [] AVF [] BUTTONHOLE  [] +BRUIT/THRILL 
     [] MEDICAL ASEPTIC PREP [] No S/S infection  [] Redness [] Drainage  [] Cultured [] Swelling [] Pain Needle Gauge                              Length: If Access Problem Dr. Ganesh Snyder: [] Yes [] No    Date:     [] N/A General Assessment LUNGS:  SaO2%  [x] Clear [] Coarse [] Crackles [] Wheezing  
            [] Diminished Location: [] RLL [] LLL [] RUL [] HANSA   
     COUGH:  [] Productive [] Dry [x] N/A  RESPIRATIONS: [x] Easy [] Labored THERAPY: [x] RA   [] NC     L/min    Mask: [] NRB [] Venti  O2%    
             [] Ventilator [] Intubated [] Trach [] BiPap [] CPap CARDIAC: [x] Regular [] Irregular [] Pericardial Rub [] JVD [] Monitored Rhythm: EDEMA: [x] None [] Generalized [] Facial [] Pedal [] UE [] LE 
           [] Pitting [] 1 [] 2 [] 3 [] 4    [] Right [] Left [] Bilateral  
    SKIN:    [x] Warm [] Hot [] Cold  [] Dry [] Pale [] Diaphoretic  
           [] Flushed [] Jaundiced [] Cyanotic [] Rash [] Weeping LOC:    [x] Alert  Oriented to: [x] Person [x] Place [x] Time  
          [] Confused [] Lethargic [] Medically sedated [] Non-responsive GI/ABDOMEN: [] Flat [x] Distended [] Soft [] Firm [] Obese [] Diarrhea 
 [] Bowel Sounds     []Nausea [] Vomiting PAIN: [x] 0 [] 1 [] 2 [] 3 [] 4 [] 5 [] 6 [] 7 [] 8 [] 9 [] 10 Scale 1-10 Action/Follow Up MOBILITY: [] Amb [] Amb/Assist [x] Bed  [] Wheelchair CUrrent LABS HBsAg ONLY: Date Drawn 11/23/18 [x] Negative [] Positive  [] Unknown. HBsAb: Date Drawn 11/23/18 [x] Susceptible <10 [] Immune ?10 [] Unknown Date of Current Labs:  
    
Lab Results Component Value Date/Time  Sodium 141 11/23/2018 02:30 PM  
 Potassium 4.1 11/23/2018 02:30 PM  
 Chloride 109 (H) 11/23/2018 02:30 PM  
 CO2 25 11/23/2018 02:30 PM  
 BUN 43 (H) 11/23/2018 02:30 PM  
 Creatinine 4.25 (H) 11/23/2018 02:30 PM  
 Calcium 8.4 (L) 11/23/2018 02:30 PM  
 Magnesium 2.1 11/23/2018 02:30 PM  
 HCT 27.0 (L) 11/23/2018 02:30 PM  
 HGB 9.1 (L) 11/23/2018 02:30 PM  
 PLATELET 028 63/18/6817 02:30 PM  
 INR 1.1 05/17/2011 12:02 AM  
  
Education Person Educated: [] Patient [] Family [] Other Knowledge base: [] None [] Minimal [] Substantial   
     Barriers to learning  [] None Preferred method of learning: [] Written [] Oral [] Visual [] Hands on Topic: [] Access Care [] S&S of infection [] Fluid Management [] K+ 
               [] Procedural    [] Albumin [] Medications [] Tx Options [] Transplant 
                [] Diet [] Other Teaching Tools: [] Explain [] Demonstration [] Handout [] Teachback Ro/hemodiaylsis machine safety checks- before each treatment Machine Serial # 13 
 [] # 1 [id number] [] # 2 [id number] [] # 3 [id number] [] # 4 [id number] [] # 5 [id number] [] # [id number] [] # [phone] Alarm Test: [x] Pass Time 8698       RO Serial # 13 
[] [id number] (Large dual station RO) [] # 1  [id number]  (Portable # 1) [] # 2  [id number]  (Portable # 2) [] # 3  [id number]  (Portable # 3) [] # 4  [id number]  (Portable # 4) [] # 5  [id number]  (Portable # 5) Lot #s: Dialyzer [id number] exp. Tubing 21u71-2 exp. Saline -jt exp. [x] RO/Machine Log Complete    [x] Extracorporeal circuit Tested for integrity Dialysate: pH 7.4 Temp. 36  Conductivity: Meter 14 HD Machine 14  
chlorine testing- before each treatment and every 4 hours Total Chlorine: [x] Less than 0.1 ppm Time: 1150 2nd Check Time:  
(If greater than 0.1 ppm from Primary then every 30 minutes from Secondary) treatment Iniation-with dialysis precautions [x] All Connections Secured   [x] Saline Line Double Clamped    
[x] Venous Parameters Set    [x] Arterial Parameters Set    [x] Prime Given 250 ml            [x] Air Foam Detector Engaged Cooper Nurse Signature/Title_Philip P Long__ Pre-Treatment Time 1200 B/P 142/83 HR 91 Temp. 98.5 Resp Rate 18 Pre Wt  
UF Calculations:   
Wt to lose:3000 ml(+) Oral:  ml(+) IV Meds/Fluids/Blood prods  ml(+) Prime/Rinse 500 ml 
(=)Total UF Goal 3500 mL Scale Type:[] Bed scale [] Sling Scale [] Wheel Chair Scale [x]  Not Ordered [] Unable to obtain pt on stretcher Tx Initiation Note: right IJ catheter accessed and treatment started without complication  
[x] Time Out/Safety Check  Time: 4663 [facility] Signature/Title_Devonte Gilliam INTRADIALYTIC MONITORING 
(SEE ATTACHED FLOWSHEET) POST TREATMENT Time 1515 B/P 145/94 HR 85 Temp 98.5 Resp. Rate 18 Post wt Time Medication Dose Volume Route Initials [facility] Signatures Title Initials Time 52 [facility] RN PL [phone] [facility] Signature/Title:_Devonte Gilliam_ Dialyzer cleared: [] Good [x] Fair [] Poor    Blood Processed 56 Liters Net UF Removed 3000 mL Post Tx Access: AVF/AVG: Bleeding Stop                   Art. min Varghese min []+bruit/thrill Catheter: Locking Solution heparin     Art. 1.6 ml Varghese 1.6 ml 
 
Post Assessment:  Skin: []Warm []Dry []Diaphoretic []Flushed [] Pale []Cyanotic Lungs: []Clear []Coarse []Crackles []Wheezing Cardiac: []Regular []Irregular  []Monitored rhyth Edema: []None []General []Facial []Pedal  []UE []LE []RIGHT []LEFT    []Bilateral  
   Pain: []0 []1[]2 []3 []4 []5 []6 []7 []8 []9 []10  
POST Tx Note:removed 3 liters patient tolerated treatment Primary Nurse Report: First initial/Last name/Title Post Dialysis:PORTER Hernandez RN_         Time:_1515_ Abbreviations: AVG-arterial venous graft, AVF-arterial venous fistula, IJ-Internal Jugular, Subcl-Subclavian, Fem-Femoral, Tx-treatment, AP/HR-apical heart rate, DFR-dialysate flow rate, BFR-blood flow rate, AP-arterial pressure, -venous pressure, UF-ultrafiltrate, TMP-transmembrane pressure, Varghese-Venous, Art-Arterial, RO-Reverse Osmosis

## 2018-11-26 NOTE — DISCHARGE INSTRUCTIONS
Shortness of Breath: Care Instructions  Your Care Instructions  Shortness of breath has many causes. Sometimes conditions such as anxiety can lead to shortness of breath. Some people get mild shortness of breath when they exercise. Trouble breathing also can be a symptom of a serious problem, such as asthma, lung disease, emphysema, heart problems, and pneumonia. If your shortness of breath continues, you may need tests and treatment. Watch for any changes in your breathing and other symptoms. Follow-up care is a key part of your treatment and safety. Be sure to make and go to all appointments, and call your doctor if you are having problems. It's also a good idea to know your test results and keep a list of the medicines you take. How can you care for yourself at home? · Do not smoke or allow others to smoke around you. If you need help quitting, talk to your doctor about stop-smoking programs and medicines. These can increase your chances of quitting for good. · Get plenty of rest and sleep. · Take your medicines exactly as prescribed. Call your doctor if you think you are having a problem with your medicine. · Find healthy ways to deal with stress. ? Exercise daily. ? Get plenty of sleep. ? Eat regularly and well. When should you call for help? Call 911 anytime you think you may need emergency care. For example, call if:    · You have severe shortness of breath.     · You have symptoms of a heart attack. These may include:  ? Chest pain or pressure, or a strange feeling in the chest.  ? Sweating. ? Shortness of breath. ? Nausea or vomiting. ? Pain, pressure, or a strange feeling in the back, neck, jaw, or upper belly or in one or both shoulders or arms. ? Lightheadedness or sudden weakness. ? A fast or irregular heartbeat. After you call 911, the  may tell you to chew 1 adult-strength or 2 to 4 low-dose aspirin. Wait for an ambulance.  Do not try to drive yourself.    Call your doctor now or seek immediate medical care if:    · Your shortness of breath gets worse or you start to wheeze. Wheezing is a high-pitched sound when you breathe.     · You wake up at night out of breath or have to prop your head up on several pillows to breathe.     · You are short of breath after only light activity or while at rest.    Watch closely for changes in your health, and be sure to contact your doctor if:    · You do not get better over the next 1 to 2 days. Where can you learn more? Go to http://nato-kenneth.info/. Enter S780 in the search box to learn more about \"Shortness of Breath: Care Instructions. \"  Current as of: December 6, 2017  Content Version: 11.8  © 7099-3213 [employer]. Care instructions adapted under license by [employer] (which disclaims liability or warranty for this information). If you have questions about a medical condition or this instruction, always ask your healthcare professional. [place] [address] any warranty or liability for your use of this information. Kidney Dialysis: Care Instructions  Your Care Instructions    Dialysis is a process that filters wastes from the blood when your kidneys can no longer do the job. It is not a cure, but it can help you live longer and feel better. It is a lifesaving treatment when you have kidney failure. Normal kidneys work 24 hours a day to clean wastes from your blood. Your kidneys are not able to do this job, so a process called dialysis will do some of the work for your kidneys. You and your doctor will decide which type of dialysis you should have. Peritoneal dialysis uses the lining of your belly (peritoneum) to filter your blood. You can do it at home, on a daily basis. Hemodialysis uses a man-made filter called a dialyzer to clean your blood. Most people need to go to a hospital or clinic 3 days a week for several hours each time.  Sometimes hemodialysis can be done at home. It is normal to have questions about your treatment, and you have a right to know what is happening to you. Learning about dialysis can help you take an active role in your treatment. Dialysis does not cure kidney disease, but it can help you live longer and feel better. You will need to follow your diet and treatment schedule carefully. Follow-up care is a key part of your treatment and safety. Be sure to make and go to all appointments, and call your doctor if you are having problems. It's also a good idea to know your test results and keep a list of the medicines you take. What do you need to know about peritoneal dialysis? Peritoneal dialysis uses the lining of your belly (or peritoneal membrane) to filter your blood. Before you can begin peritoneal dialysis, your doctor will need to place a thin tube called a catheter in your belly. This is the dialysis access. · Peritoneal dialysis can be done at home or in any clean place. You may be able to do it while you sleep. · You can do it by yourself. You do not have to rely on help from others. · You can do it at the times you choose as long as you do the right number of treatments. · It has to be done every day of the week. · Some people find it hard to do all the required steps. · It increases your chance for a serious infection of the lining of the belly (peritoneum). What do you need to know about hemodialysis? Hemodialysis uses a man-made membrane called a dialyzer to clean your blood. You are connected to the dialyzer by tubes attached to your blood vessels. Before you start hemodialysis, your doctor will create a site where the blood can flow in and out of your body during your dialysis sessions. This site is called the vascular access. It may be a fistula, made by connecting an artery and a vein. Or it may be a graft, which is a tube implanted under your skin.   · Hemodialysis is done mainly by trained health workers who can watch for any problems. · It allows you to be in contact with other people having dialysis. This can help provide emotional support. · You can schedule your treatments in the evenings so you can keep working. · You may be able to do home hemodialysis, which gives you more control over your schedule. · It usually needs to be done on a set schedule 3 times a week. · It can cause side effects. The most common side effects are low blood pressure and muscle cramps. These can often be treated easily. · It requires needle sticks during every treatment, which bothers some people. Others get used to it and even do the needle sticks themselves. How can you care for yourself at home? · Be sure to have all of your dialysis sessions. Do not try to shorten or skip your sessions. You have a better chance of a longer and healthier life by getting your full treatment. · Your doctor or health care team will show you the steps you need to go through each day before, during, and after dialysis. Be sure to follow these steps. If you do not understand a step, talk to your team.  · Your doctor and dietitian will help you design menus that follow your diet. Be sure to follow your diet guidelines. ? You will need to limit fluids and certain foods that contain salt (sodium), potassium, and phosphorus. ? You may need to follow a heart-healthy diet to keep the fat (cholesterol) in your blood under control. ? You may need higher levels of protein in your diet. · Your doctor may recommend certain vitamins. But do not take any other medicine, including over-the-counter medicines, vitamins, and herbal products, without talking to your doctor first.  · Do not smoke. Smoking raises your risk of many health problems, including more kidney damage. If you need help quitting, talk to your doctor about stop-smoking programs and medicines. These can increase your chances of quitting for good.   · Do not take ibuprofen (Advil, Motrin), naproxen (Aleve), or similar medicines, unless your doctor tells you to. These medicines may make kidney problems worse. When should you call for help? Call your doctor now or seek immediate medical care if:    · You have a fever.     · You are dizzy or lightheaded, or you feel like you may faint.     · You are confused or cannot think clearly.     · You have new or worse nausea or vomiting.     · You have new or more blood in your urine.     · You have new swelling.    Watch closely for changes in your health, and be sure to contact your doctor if:    · You do not get better as expected. Where can you learn more? Go to http://nato-kenneth.info/. Enter C114 in the search box to learn more about \"Kidney Dialysis: Care Instructions. \"  Current as of: March 15, 2018  Content Version: 11.8  © 4157-3189 [employer]. Care instructions adapted under license by [employer] (which disclaims liability or warranty for this information). If you have questions about a medical condition or this instruction, always ask your healthcare professional. [address] any warranty or liability for your use of this information. iMedX Activation    Thank you for requesting access to iMedX. Please follow the instructions below to securely access and download your online medical record. iMedX allows you to send messages to your doctor, view your test results, renew your prescriptions, schedule appointments, and more. How Do I Sign Up? 1. In your internet browser, go to [email or web address]. 2. Click on the First Time User? Click Here link in the Sign In box. You will see the New Member Sign Up page. 3. Enter your iMedX Access Code exactly as it appears below. You will not need to use this code after youve completed the sign-up process. If you do not sign up before the expiration date, you must request a new code.     iMedX Access Code: [email or web address]  Expires: 2019 11:01 AM (This is the date your BranchOut access code will )    4. Enter the last four digits of your Social Security Number (xxxx) and Date of Birth (mm/dd/yyyy) as indicated and click Submit. You will be taken to the next sign-up page. 5. Create a BranchOut ID. This will be your BranchOut login ID and cannot be changed, so think of one that is secure and easy to remember. 6. Create a BranchOut password. You can change your password at any time. 7. Enter your Password Reset Question and Answer. This can be used at a later time if you forget your password. 8. Enter your e-mail address. You will receive e-mail notification when new information is available in [address] 9. Click Sign Up. You can now view and download portions of your medical record. 10. Click the Download Summary menu link to download a portable copy of your medical information. Additional Information    If you have questions, please visit the Frequently Asked Questions section of the BranchOut website at [email or web address] Remember, BranchOut is NOT to be used for urgent needs. For medical emergencies, dial 911.

## 2018-11-26 NOTE — ED PROVIDER NOTES
[facility] EMERGENCY DEPT 
 
 
11:16 AM 
 
Date: 11/26/2018 Patient Name: Jimmy Medel History of Presenting Illness Chief Complaint Patient presents with  
 Other History Provided By: Patient Chief Complaint: Dialysis Duration: 3 Days Timing:  N/A Location: N/A Severity: Moderate Modifying Factors: No modifying factors reported Associated Symptoms: minimal urination Lisa Ramon is a 48 y/o male  
with PMHx of DM and CKD who presents to the ED for moderate dialysis. Pt claims he visited ED 3 days ago for dialysis and was assigned a kidney doctor. Was told to return today for another dialysis appointment. Claims he has been experiencing minimal urination. Occasionally consumes EtOH and smokes. No modifying factors reported. No other complaints. Nursing notes regarding the HPI and triage nursing notes were reviewed. Prior medical records were reviewed. Current Facility-Administered Medications Medication Dose Route Frequency Provider Last Rate Last Dose  
 0.9% sodium chloride infusion  50 mL/hr IntraVENous DIALYSIS PRN Malini Copeland MD      
 heparin (porcine) injection 1,000 Units  1,000 Units IntraVENous Q1H PRN Malini Copeland MD      
 heparin (porcine) 1,000 unit/mL injection 1,000 Units  1,000 Units InterCATHeter DIALYSIS PRN Fortunato Cade MD      
 
Current Outpatient Medications Medication Sig Dispense Refill  pregabalin (LYRICA) 100 mg capsule TAKE 1 CAPSULE BY MOUTH THREE TIMES DAILY 90 capsule 5  clopidogrel (PLAVIX) 75 mg tablet Take 1 tablet by mouth daily. 30 tablet 3  
 lisinopril (PRINIVIL, ZESTRIL) 40 mg tablet Take 1 tablet by mouth daily. 30 tablet 3  chlorthalidone (HYGROTEN) 25 mg tablet Take  by mouth daily.  atorvastatin (LIPITOR) 80 mg tablet Take 80 mg by mouth daily.  insulin lispro (HUMALOG) 100 unit/mL injection 50 Units by SubCUTAneous route three (3) times daily (with meals).     
 carvedilol (COREG) 25 mg tablet Take 1 Tab by mouth two (2) times daily (with meals). For blood pressure/heart. Replaces metoprolol 60 Tab 10  
 insulin glargine (LANTUS) 100 unit/mL injection 70 Units by SubCUTAneous route two (2) times a day. 5 Vial 11  
 fenofibrate (LOFIBRA) 54 mg tablet Take 54 mg by mouth daily.  aspirin delayed-release (ASPIR-81) 81 mg tablet Take 81 mg by mouth daily.  amlodipine (NORVASC) 10 mg tablet Take 10 mg by mouth daily. Past History Past Medical History: 
Past Medical History:  
Diagnosis Date  CAD (coronary artery disease) MI x3, 15 stents. 2008  Chronic kidney disease  Diabetes ([facility] [address] Dx in late 25s  GERD (gastroesophageal reflux disease)  Hypertension Past Surgical History: 
Past Surgical History:  
Procedure Laterality Date  CARDIAC SURG PROCEDURE UNLIST  2008 Stents (15)  HX ORTHOPAEDIC    
 left shoulder manipulation and bone spur removal  
 
 
Family History: 
Family History Problem Relation Age of Onset  Diabetes Mother  Diabetes Father Social History: 
Social History Tobacco Use  Smoking status: Former Smoker Packs/day: 0.50 Years: 13.00 Pack years: 6.50  Smokeless tobacco: Never Used Substance Use Topics  Alcohol use: No  
 Drug use: No  
 
 
Allergies: 
No Known Allergies Patient's primary care provider (as noted in EPIC):  Jose Reyes MD 
 
Review of Systems Constitutional: Negative for diaphoresis. HENT: Negative for congestion. Eyes: Negative for discharge. Respiratory: Negative for stridor. Cardiovascular: Negative for palpitations. Gastrointestinal: Negative for diarrhea. Genitourinary: Positive for decreased urine volume. Negative for flank pain. Musculoskeletal: Negative for back pain. Neurological: Negative for weakness. Psychiatric/Behavioral: Negative for hallucinations. All other systems reviewed and are negative.  
 
 
Visit Vitals /87 Pulse 87 Temp 98.5 °F (36.9 °C) (Oral) Resp 18 Wt 84.8 kg (187 lb) SpO2 97% BMI 26.08 kg/m² PHYSICAL EXAM: 
 
CONSTITUTIONAL:  Alert, in no apparent distress;  well developed;  well nourished. HEAD:  Normocephalic, atraumatic. EYES:  EOMI. Non-icteric sclera. Normal conjunctiva. ENTM:  Nose:  no rhinorrhea. Throat:  no erythema or exudate, mucous membranes moist. 
NECK:  No JVD. Supple RESPIRATORY:  Chest clear, equal breath sounds, good air movement. CARDIOVASCULAR:  Regular rate and rhythm. No murmurs, rubs, or gallops. GI:  Normal bowel sounds, abdomen soft and non-tender. No rebound or guarding. BACK:  Non-tender. UPPER EXT:  Normal inspection. LOWER EXT:  No edema, no calf tenderness. Distal pulses intact. NEURO:  Moves all four extremities, and grossly normal motor exam. 
SKIN:  No rashes;  Normal for age. PSYCH:  Alert and normal affect. DIFFERENTIAL DIAGNOSES/ MEDICAL DECISION MAKING: 
Different diagnoses: shortness of breath in hemodialysis patient includes Congestive heart failure, noncompliance with fluid restriction diet, pneumonia, copd, bronchitis, pulmonary embolism, cardiac event to include ami/acs, combination of the above, or other etiologies. I believe that the patient's underlying shortness of breath is most likely from congestive heart failure, with this patient being noncompliant with a fluid restriction diet for hemodialysis patient. In this patient, SOB due to acute decompensation of patient's underlying CHF is higher on the initial differential diagnosis. This would also include possible patient noncompliance with medications for CHF and/or noncompliance with fluid restriction diet in ESRD hemodialysis patient. Diagnostic Study Results Abnormal lab results from this emergency department encounter: 
Labs Reviewed - No data to display Lab values for this patient within approximately the last 12 hours: 
No results found for this or any previous visit (from the past 12 hour(s)). Radiologist and cardiologist interpretations if available at time of this note: No results found. Medication(s) ordered for patient during this emergency visit encounter: 
Medications  
0.9% sodium chloride infusion (not administered)  
heparin (porcine) injection 1,000 Units (not administered)  
heparin (porcine) 1,000 unit/mL injection 1,000 Units (not administered) Medical Decision Making I am the first provider for this patient. I reviewed the vital signs, available nursing notes, past medical history, past surgical history, family history and social history. Vital Signs:  Reviewed the patient's vital signs. IMPRESSION AND MEDICAL DECISION MAKING: 
Based upon the patient's presentation with noted HPI and PE, along with the work up done in the emergency department, I believe that the patient is having acute pulmonary edema in an end stage renal disease hemodialysis patient. I do believe that the patient needs to be admitted for further diuresis and observation, as well as hemodialysis for dialysis to remove fluid, and hopefully improve the patients respiratory status. Consult Nephrology The on call nephrologist was called and the patient was presented for nephrology consult. I personally spoke with Dr. Beth Gleason, in the nephrology group, about the patient's presentation and management. I subsequently placed the noted nephrologist on the treatment team. 
 
Critical Care Note:  Respiratory Distress Critical care minutes: 32 MINUTES. Given patient's initial arrival in respiratory distress, numerous serial reevaluations of the patient's respiratory status and patient's response to various medical interventions.  
       
Given the patients underlying condition medical intervention(s) were needed, requiring numerous reevaluations of patient's vital signs and response to different emergency department therapies, total bedside time evaluating and/or treating the patient, not including procedures, is noted below. 4:29 PM 
Patient has returned from HD and feels better, no SOB. Coding Diagnoses Clinical Impression: 1. Hemodialysis patient ([address] 2. SOB (shortness of breath) Disposition Disposition:  Home. Marianna Ahn M.D. [place] Board Certified Emergency Physician Provider Attestation: If a scribe was utilized in generation of this patient record, I personally performed the services described in the documentation, reviewed the documentation, as recorded by the scribe in my presence, and it accurately records the patient's history of presenting illness, review of systems, patient physical examination, and procedures performed by me as the attending physician. Marianna Ahn M.D. [place] Board Certified Emergency Physician 
11/26/2018. 
11:17 AM

## 2018-11-26 NOTE — CONSULTS
Consult Note    Assessment:     1. ESRD. Moderately hypervolemic with stable electrolytes as of 11/23. 2. HTN, suboptimally controlled. 3. Anemia of ESRD. H/H is below goal.   4. Secondary hyperparathyroidism of ESRD. Current status is unknown. 5. H/o of cad. No overt chf.       Recommendations:   1. Hemodialysis today for 4 hours. Target uf 3 liters. Meanwhile we started the process of placing the patient into op dialysis unit. 2. Bring all antihtn medications for review. If the list is accurate, continue current regimen except stop hctz. 3. LUCAS will be started at op unit. 4. Phos/pth will be checked at op unit. 5. Avoid Gadolinium due to its association with nephrogenic systemic fibrosis in a patients with severe ARF and ESRD. 6. Please dose all medications for  creatinine clearance <15/dialysis. 7. Prtotect left  arm from blood pressure checks, blood draws, peripheral iv's. Avoid PICC lines on either arm in order to preserve veins for dialysis access creation. Consult requested by: Marli Lew MD    ADMIT DATE: 11/26/2018  CONSULT DATE: November 26, 2018                 Admission diagnosis: esrd. Reason for Nephrology Consultation: Management of ESRD  HPI: Isha Fontaine is a 47 y.o. male [address] with h/o of dm, htn, cad, s/p multiple stents, cva with chronic lt sided numbness and known diagnosis of ESRD for which he was sarted [place] hemodialysis approx 10 month ago while he was at [employer]. He was on [facility] schedule. Patient was released at the beginning of November with plans to continue dialysis in [facility] (where he used to live prior to incarceration). Patient, however, moved to [place] where he grew up. He dialyzed only twice through ED, last one being on Friday November 23 through [facility]. He overall feels well. Patient presented to [facility] today for routine dialysis.         Past Medical History:   Diagnosis Date    CAD (coronary artery disease)     MI x3, 15 stents. 2008    Chronic kidney disease     Diabetes (HCC)     Dx in late 25s    GERD (gastroesophageal reflux disease)     Hypertension       Past Surgical History:   Procedure Laterality Date    CARDIAC SURG PROCEDURE UNLIST  2008    Stents (15)    HX ORTHOPAEDIC      left shoulder manipulation and bone spur removal       Social History     Socioeconomic History    Marital status:      Spouse name: Not on file    Number of children: Not on file    Years of education: Not on file    Highest education level: Not on file   Social Needs    Financial resource strain: Not on file    Food insecurity - worry: Not on file    Food insecurity - inability: Not on file   Audit Verify needs - medical: Not on file   Audit Verify needs - non-medical: Not on file   Occupational History    Not on file   Tobacco Use    Smoking status: Former Smoker     Packs/day: 0.50     Years: 13.00     Pack years: 6.50    Smokeless tobacco: Never Used   Substance and Sexual Activity    Alcohol use: No    Drug use: No    Sexual activity: Yes     Partners: Female   Other Topics Concern    Not on file   Social History Narrative    Not on file       Family History   Problem Relation Age of Onset    Diabetes Mother     Diabetes Father      No Known Allergies     Home Medications:     (Not in a hospital admission)    Current Inpatient Medications:     Current Facility-Administered Medications   Medication Dose Route Frequency    0.9% sodium chloride infusion  50 mL/hr IntraVENous DIALYSIS PRN    heparin (porcine) injection 1,000 Units  1,000 Units IntraVENous Q1H PRN    heparin (porcine) 1,000 unit/mL injection 1,000 Units  1,000 Units InterCATHeter DIALYSIS PRN     Current Outpatient Medications   Medication Sig    pregabalin (LYRICA) 100 mg capsule TAKE 1 CAPSULE BY MOUTH THREE TIMES DAILY    clopidogrel (PLAVIX) 75 mg tablet Take 1 tablet by mouth daily.     lisinopril (PRINIVIL, ZESTRIL) 40 mg tablet Take 1 tablet by mouth daily.  chlorthalidone (HYGROTEN) 25 mg tablet Take  by mouth daily.  atorvastatin (LIPITOR) 80 mg tablet Take 80 mg by mouth daily.  insulin lispro (HUMALOG) 100 unit/mL injection 50 Units by SubCUTAneous route three (3) times daily (with meals).  carvedilol (COREG) 25 mg tablet Take 1 Tab by mouth two (2) times daily (with meals). For blood pressure/heart. Replaces metoprolol    insulin glargine (LANTUS) 100 unit/mL injection 70 Units by SubCUTAneous route two (2) times a day.  fenofibrate (LOFIBRA) 54 mg tablet Take 54 mg by mouth daily.  aspirin delayed-release (ASPIR-81) 81 mg tablet Take 81 mg by mouth daily.  amlodipine (NORVASC) 10 mg tablet Take 10 mg by mouth daily. Review of Systems:   No fever or chills. No sore throat. No cough or hemoptysis. No shortness of breath or chest pain. No orthopnea or paroxysmal nocturnal dyspnea. Good appetite. No nausea, vomiting, abdominal pain, melena or hematochezia. No constipation or diarrhea. No dysuria, no gross hematuria of voiding difficulties. No ankle swelling, no joint paints. No muscle aches. No skin changes. No dizziness or lightheadedness. No headaches. Physical Assessment:     Vitals:    11/26/18 1230 11/26/18 1245 11/26/18 1300 11/26/18 1315   BP: (!) 155/97 (!) 153/93 (!) 143/95 (!) 150/98   Pulse: 86 87 86 86   Resp: 18 18 18 18   Temp:       TempSrc:       SpO2:       Weight:         Last 3 Recorded Weights in this Encounter    11/26/18 1104   Weight: 84.8 kg (187 lb)     Admission weight: Weight: 84.8 kg (187 lb) (11/26/18 1104)    No intake or output data in the 24 hours ending 11/26/18 1322    Patient is in no apparent distress. HEENT: Head is normocephalic and atraumatic. Pupils are round, equal, reactive to light. Sclerae are anicteric. Oropharynx clear. Neck: no cervical lymphadenopathy or thyromegaly.    Lungs: good air entry, clear to auscultation bilaterally. Trachea at the midline. Cardiovascular system: S1, S2, regular rate and rhythm. No murmurs, gallops or rubs. No jvd. Carotid upstroke 2 + bilaterally. Abdomen: soft, non tender, non distended. Positive bowel sounds. No hepatosplenomegaly. No abdominal bruits. Extremities: no clubbing, cyanosis. Trace bl le edema. Strong dorsalis pedis pulses. Brisk capillary refill on the toes bilaterally. Integumentary: skin is grossly intact. Neurologic: Alert, oriented time three. Cooperative and appropriate. No gross motor or sensory deficits. Dialysis access: rt ij tdc, exit site is clear. Data Review:    Labs: Results:       Chemistry Recent Labs     11/23/18  1430   *      K 4.1   *   CO2 25   BUN 43*   CREA 4.25*   CA 8.4*   AGAP 7   BUCR 10*         CBC w/Diff Recent Labs     11/23/18  1430   WBC 8.8   RBC 3.29*   HGB 9.1*   HCT 27.0*      GRANS 78*   LYMPH 11*   EOS 4         Iron/Ferritin No results for input(s): IRON in the last 72 hours. No lab exists for component: TIBCCALC   PTH/VIT D No results for input(s): PTH in the last 72 hours.     No lab exists for component: VITD            Desiree Nieto M.D  [facility]  Office [phone]  Pager [phone]    November 26, 2018

## 2018-11-28 ENCOUNTER — HOSPITAL ENCOUNTER (EMERGENCY)
Age: 53
Discharge: HOME | End: 2018-11-28
Attending: EMERGENCY MEDICINE
Payer: MEDICAID

## 2018-11-28 VITALS
HEART RATE: 81 BPM | BODY MASS INDEX: 26.46 KG/M2 | RESPIRATION RATE: 16 BRPM | SYSTOLIC BLOOD PRESSURE: 140 MMHG | TEMPERATURE: 97.2 F | OXYGEN SATURATION: 100 % | DIASTOLIC BLOOD PRESSURE: 72 MMHG | HEIGHT: 71 IN | WEIGHT: 189 LBS

## 2018-11-28 DIAGNOSIS — N18.6 ESRD NEEDING DIALYSIS (HCC): Primary | ICD-10-CM

## 2018-11-28 DIAGNOSIS — Z99.2 ESRD NEEDING DIALYSIS (HCC): Primary | ICD-10-CM

## 2018-11-28 PROCEDURE — 90935 HEMODIALYSIS ONE EVALUATION: CPT

## 2018-11-28 PROCEDURE — 99285 EMERGENCY DEPT VISIT HI MDM: CPT

## 2018-11-28 PROCEDURE — 86704 HEP B CORE ANTIBODY TOTAL: CPT

## 2018-11-28 PROCEDURE — 86705 HEP B CORE ANTIBODY IGM: CPT

## 2018-11-28 RX ORDER — SODIUM CHLORIDE 9 MG/ML
50 INJECTION, SOLUTION INTRAVENOUS
Status: DISCONTINUED | OUTPATIENT
Start: 2018-11-28 | End: 2018-11-28 | Stop reason: HOSPADM

## 2018-11-28 RX ORDER — HEPARIN SODIUM 5000 [USP'U]/ML
1000 INJECTION, SOLUTION INTRAVENOUS; SUBCUTANEOUS
Status: ACTIVE | OUTPATIENT
Start: 2018-11-28 | End: 2018-11-28

## 2018-11-28 RX ORDER — HEPARIN SODIUM 1000 [USP'U]/ML
1000 INJECTION, SOLUTION INTRAVENOUS; SUBCUTANEOUS
Status: DISCONTINUED | OUTPATIENT
Start: 2018-11-28 | End: 2018-11-28 | Stop reason: HOSPADM

## 2018-11-28 NOTE — ED TRIAGE NOTES
Patient states he is here for dialysis and that this is supposed to be his last time coming here. Patient states Dr Timi Elizabeth sent him here and Dandre Parker supposed to put all the orders in\".

## 2018-11-28 NOTE — ED PROVIDER NOTES
EMERGENCY DEPARTMENT HISTORY AND PHYSICAL EXAM 
 
8:22 AM 
 
 
Date: 11/28/2018 Patient Name: Triston Berg History of Presenting Illness Chief Complaint Patient presents with  
 Other History Provided By: Patient Additional History (Context): Triston Berg is a 47 y.o. male with DM, HTN, CAD, CKD who presents with acute need for dialysis. Pt's last dialysis was 2 days ago in ED as pt does not have a dialysis chair and it waiting for that arrangement. He denies any complaints or sx and presents just to be dialyzed. PCP: Lilia Bedoya MD 
 
Current Facility-Administered Medications Medication Dose Route Frequency Provider Last Rate Last Dose  
 0.9% sodium chloride infusion  50 mL/hr IntraVENous DIALYSIS PRN Dina Copeland MD      
 heparin (porcine) 1,000 unit/mL injection 1,000 Units  1,000 Units InterCATHeter DIALYSIS PRN Jacquie Minor MD      
 
Current Outpatient Medications Medication Sig Dispense Refill  pregabalin (LYRICA) 100 mg capsule TAKE 1 CAPSULE BY MOUTH THREE TIMES DAILY 90 capsule 5  clopidogrel (PLAVIX) 75 mg tablet Take 1 tablet by mouth daily. 30 tablet 3  
 lisinopril (PRINIVIL, ZESTRIL) 40 mg tablet Take 1 tablet by mouth daily. 30 tablet 3  chlorthalidone (HYGROTEN) 25 mg tablet Take  by mouth daily.  atorvastatin (LIPITOR) 80 mg tablet Take 80 mg by mouth daily.  insulin lispro (HUMALOG) 100 unit/mL injection 50 Units by SubCUTAneous route three (3) times daily (with meals).  carvedilol (COREG) 25 mg tablet Take 1 Tab by mouth two (2) times daily (with meals). For blood pressure/heart. Replaces metoprolol 60 Tab 10  
 insulin glargine (LANTUS) 100 unit/mL injection 70 Units by SubCUTAneous route two (2) times a day. 5 Vial 11  
 fenofibrate (LOFIBRA) 54 mg tablet Take 54 mg by mouth daily.  aspirin delayed-release (ASPIR-81) 81 mg tablet Take 81 mg by mouth daily.     
 amlodipine (NORVASC) 10 mg tablet Take 10 mg by mouth daily. Past History Past Medical History: 
Past Medical History:  
Diagnosis Date  CAD (coronary artery disease) MI x3, 15 stents. 2008  Chronic kidney disease  Diabetes ([address] Dx in late 25s  GERD (gastroesophageal reflux disease)  Hypertension Past Surgical History: 
Past Surgical History:  
Procedure Laterality Date  CARDIAC SURG PROCEDURE UNLIST  2008 Stents (15)  HX ORTHOPAEDIC    
 left shoulder manipulation and bone spur removal  
 
 
Family History: 
Family History Problem Relation Age of Onset  Diabetes Mother  Diabetes Father Social History: 
Social History Tobacco Use  Smoking status: Current Some Day Smoker Packs/day: 0.25 Years: 13.00 Pack years: 3.25  Smokeless tobacco: Never Used Substance Use Topics  Alcohol use: No  
 Drug use: No  
 
 
Allergies: 
No Known Allergies Review of Systems Review of Systems Constitutional: Negative for chills and fever. Respiratory: Negative for cough and shortness of breath. Cardiovascular: Negative for chest pain and leg swelling. Neurological: Negative for numbness and headaches. All other systems reviewed and are negative. Physical Exam  
 
Visit Vitals /72 Pulse 81 Temp 97.2 °F (36.2 °C) Resp 16 Ht 5' 11\" (1.803 m) Wt 85.7 kg (189 lb) SpO2 100% BMI 26.36 kg/m² Physical Exam  
Constitutional: He is oriented to person, place, and time. He appears well-developed and well-nourished. No distress. HENT:  
Head: Normocephalic and atraumatic. Mouth/Throat: Oropharynx is clear and moist.  
Eyes: Conjunctivae and EOM are normal. Pupils are equal, round, and reactive to light. No scleral icterus. Neck: Normal range of motion. Neck supple. Cardiovascular: Normal rate, regular rhythm and normal heart sounds. No murmur heard.  
Pulmonary/Chest: Effort normal and breath sounds normal. No respiratory distress. R subclavian dialysis catheter No respiratory distress Abdominal: Soft. Bowel sounds are normal. He exhibits no distension. There is no tenderness. Musculoskeletal: He exhibits no edema. Lymphadenopathy:  
  He has no cervical adenopathy. Neurological: He is alert and oriented to person, place, and time. Coordination normal.  
Skin: Skin is warm and dry. No rash noted. Psychiatric: He has a normal mood and affect. His behavior is normal.  
Nursing note and vitals reviewed. Diagnostic Study Results Labs - No results found for this or any previous visit (from the past 12 hour(s)). Radiologic Studies - No orders to display Medical Decision Making I am the first provider for this patient. I reviewed the vital signs, available nursing notes, past medical history, past surgical history, family history and social history. Vital Signs-Reviewed the patient's vital signs. Pulse Oximetry Analysis -  100% on room air (Interpretation) wnl 
 
Cardiac Monitor: 
Rate: 81 
Rhythm:  Normal Sinus Rhythm Records Reviewed: Nursing Notes (Time of Review: 8:22 AM) 
 
ED Course: Progress Notes, Reevaluation, and Consults: 
 
 
 
Provider Notes (Medical Decision Making): MDM Number of Diagnoses or Management Options ESRD needing dialysis [facility]):  
Diagnosis management comments: Here for scheduled hospital dialysis no new complaints. Diagnosis Clinical Impression: 1. ESRD needing dialysis ([address] Disposition: Discharge Follow-up Information Follow up With Specialties Details Why Contact LTAC, located within [facility] EMERGENCY DEPT Emergency Medicine  As needed, If symptoms worsen [address] 
[phone] Hector Walden MD Internal Medicine Schedule an appointment as soon as possible for a visit for ED follow up appointment  [address] [facility] [address] [place] [address] 
[phone] Medication List  
  
ASK your doctor about these medications ASPIR-81 81 mg tablet Generic drug:  aspirin delayed-release 
  
atorvastatin 80 mg tablet Commonly known as:  LIPITOR 
  
carvedilol 25 mg tablet Commonly known as:  Donneta Apgar Take 1 Tab by mouth two (2) times daily (with meals). For blood pressure/heart. Replaces metoprolol 
  
chlorthalidone 25 mg tablet Commonly known as:  HYGROTEN 
  
clopidogrel 75 mg Tab Commonly known as:  PLAVIX Take 1 tablet by mouth daily. fenofibrate 54 mg tablet Commonly known as:  LOFIBRA HumaLOG U-100 Insulin 100 unit/mL injection Generic drug:  insulin lispro 
  
insulin glargine 100 unit/mL injection Commonly known as:  LANTUS 
70 Units by SubCUTAneous route two (2) times a day. lisinopril 40 mg tablet Commonly known as:  Fabian Zhou Take 1 tablet by mouth daily. NORVASC 10 mg tablet Generic drug:  amLODIPine 
  
pregabalin 100 mg capsule Commonly known as:  May Laughter TAKE 1 CAPSULE BY MOUTH THREE TIMES DAILY 
  
  
 
_______________________________ Attestations: 
Scribe Attestation [facility] acting as a scribe for and in the presence of Armando Goodrich MD     
2018 at 8:22 AM 
    
Provider Attestation:     
I personally performed the services described in the documentation, reviewed the documentation, as recorded by the scribe in my presence, and it accurately and completely records my words and actions. 2018 at 8:22 AM - Armando Goodrich MD   
_______________________________

## 2018-11-28 NOTE — PROGRESS NOTES
. 
Acute HEmodialysis flow sheet Patient information Name:Rogerio NOVAK [employer] MRN: [id number]      ZZF:[id number]  [place]# Room#RASHEEDA/RASHEEDA Hospital: [place] [address] DX: Dialysis []1st Time Acute  []Stat[x] Routine []Urgent []Chronic Unit  
       []Acute Room []Bedside  []ICU/CCU []ER Isolation Precautions: [x]Dialysis[] Airborne []Contact []Droplet []Reverse Special Considerations:    [] Blood Consent Verified  [x]N/A Allergies: 
[] NKA  [] Reviewed No Known Allergies Code Status [x]Full Code [] DNR  []Other Diet: renal Diabetic: [x]Yes []No 
  
 Hemodialysis Orders Physician: Graciela Cardona Dialyzer Revaclear 300 Duration 3 BFR: 300 DFR:600 Dialysate: K 3 CA 2.5 Na 140 Bicarb 35 Dry Weight: UF Goal: 3000 ml Heparin:  []Bolus   Units    []Hourly  Units      [x]None BP Tx:  []NS  200ml/Bolus    []Other     [x]N/A  
    Labs: Pre:  Post: [x]N/A Additional Orders: [x]N/A [x]Signed Treatment Consent   [x] Verified   [] Obtained Primary Nurse Report: First initial/ Last Name/Title Pre Dialysis: Naya Quiñones RN    Time: 4259 Access CATHETER ACCESS:  [] N/A  [x] RIGHT  [] LEFT  [] IJ  [] SUBCL [] FEM [] First use X-ray  [x] Tunnel     [] Non-Tunneled [x] No S/S infection  [] Redness [] Drainage  [] Cultured [] Swelling 
                       [] Pain  
                       [x] Medical Aseptic [] Prep Dressing Changed  
                       [] Clotted [x] Patent []      Flows: [x] Good [] Poor [] Reversed If Access Problem Dr. Graeme Ibarra: [] Yes [] No    Date:    [x] N/A  
     GRAFT/FISTULA ACCESS:  [x] N/A  [] RIGHT  [] LEFT  [] UE  [] LE   
                       [] AVG  [] AVF [] BUTTONHOLE  [] +BRUIT/THRILL 
     [] MEDICAL ASEPTIC PREP [] No S/S infection  [] Redness [] Drainage  [] Cultured [] Swelling [] Pain Needle Gauge                              Length: If Access Problem Dr. Nellie Mahajan: [] Yes [] No    Date:     [] N/A General Assessment LUNGS:  SaO2%  [x] Clear [] Coarse [] Crackles [] Wheezing  
            [] Diminished Location: [] RLL [] LLL [] RUL [] HANSA   
     COUGH:  [] Productive [] Dry [x] N/A  RESPIRATIONS: [x] Easy [] Labored THERAPY: [x] RA   [] NC     L/min    Mask: [] NRB [] Venti  O2%    
             [] Ventilator [] Intubated [] Trach [] BiPap [] CPap CARDIAC: [x] Regular [] Irregular [] Pericardial Rub [] JVD [] Monitored Rhythm: EDEMA: [x] None [] Generalized [] Facial [] Pedal [] UE [] LE 
           [] Pitting [] 1 [] 2 [] 3 [] 4    [] Right [] Left [] Bilateral  
    SKIN:    [x] Warm [] Hot [] Cold  [] Dry [] Pale [] Diaphoretic  
           [] Flushed [] Jaundiced [] Cyanotic [] Rash [] Weeping LOC:    [x] Alert  Oriented to: [x] Person [x] Place [x] Time  
          [] Confused [] Lethargic [] Medically sedated [] Non-responsive GI/ABDOMEN: [x] Flat [] Distended [] Soft [] Firm [] Obese [] Diarrhea 
 [] Bowel Sounds     []Nausea [] Vomiting PAIN: [x] 0 [] 1 [] 2 [] 3 [] 4 [] 5 [] 6 [] 7 [] 8 [] 9 [] 10 Scale 1-10 Action/Follow Up MOBILITY: [] Amb [] Amb/Assist [x] Bed  [] Wheelchair CUrrent LABS HBsAg ONLY: Date Drawn 11/23/18 [] Negative [] Positive  [] Unknown. HBsAb: Date Drawn 11/23/18 [x] Susceptible <10 [] Immune ?10 [] Unknown Date of Current Labs:  
    
Lab Results Component Value Date/Time  Sodium 141 11/23/2018 02:30 PM  
 Potassium 4.1 11/23/2018 02:30 PM  
 Chloride 109 (H) 11/23/2018 02:30 PM  
 CO2 25 11/23/2018 02:30 PM  
 BUN 43 (H) 11/23/2018 02:30 PM  
 Creatinine 4.25 (H) 11/23/2018 02:30 PM  
 Calcium 8.4 (L) 11/23/2018 02:30 PM  
 Magnesium 2.1 11/23/2018 02:30 PM  
 HCT 27.0 (L) 11/23/2018 02:30 PM  
 HGB 9.1 (L) 11/23/2018 02:30 PM  
 PLATELET 255 33/00/8907 02:30 PM  
 INR 1.1 05/17/2011 12:02 AM  
  
Education Person Educated: [x] Patient [] Family [] Other Knowledge base: [x] None [] Minimal [] Substantial   
     Barriers to learning  [x] None Preferred method of learning: [] Written [x] Oral [] Visual [] Hands on Topic: [] Access Care [] S&S of infection [] Fluid Management [] K+ 
               [x] Procedural    [] Albumin [] Medications [] Tx Options [] Transplant 
                [] Diet [] Other Teaching Tools: [x] Explain [] Demonstration [] Handout [] Teachback Ro/hemodiaylsis machine safety checks- before each treatment Machine Serial #13 [] # 1 [id number] [] # 2 [id number] [] # 3 [id number] [] # 4 [id number] [] # 5 [id number] [] # [phone] [] # [phone] Alarm Test: [x] Pass Time        RO Serial #13 [] [id number] (Large dual station RO) [] # 1  [id number]  (Portable # 1) [] # 2  [id number]  (Portable # 2) [] # 3  [id number]  (Portable # 3) [] # 4  [id number]  (Portable # 4) [] # 5  [id number]  (Portable # 5) Lot #s: Dialyzer [id number] exp. Tubing 17j12-10 exp. Saline -jt exp. [x] RO/Machine Log Complete    [x] Extracorporeal circuit Tested for integrity Dialysate: pH 7.4 Temp. 36  Conductivity: Meter 14 HD Machine 14  
chlorine testing- before each treatment and every 4 hours Total Chlorine: [x] Less than 0.1 ppm Time: 1045 2nd Check Time:  
(If greater than 0.1 ppm from Primary then every 30 minutes from Secondary) treatment Iniation-with dialysis precautions [x] All Connections Secured   [x] Saline Line Double Clamped    
[x] Venous Parameters Set    [x] Arterial Parameters Set    [x] Prime Given 250 ml            [x] Air Foam Detector Engaged Cooper Nurse Signature/Title_Philip P Long__ Pre-Treatment Time 1045 B/P 148/93 HR 79 Temp. 97.8 Resp Rate 18 Pre Wt  
UF Calculations:   
Wt to lose:3000 ml(+) Oral:  ml(+) IV Meds/Fluids/Blood prods  ml(+) Prime/Rinse 500 ml 
(=)Total UF Goal 3500 mL Scale Type:[] Bed scale [] Sling Scale [] Wheel Chair Scale [x]  Not Ordered [] Unable to obtain pt on stretcher Tx Initiation Note: sheryl IJ catheter accessed and treatment started without complication  
[x] Time Out/Safety Check  Time: 4420 [facility] Signature/Title_Devonte Gilliam INTRADIALYTIC MONITORING 
(SEE ATTACHED FLOWSHEET) POST TREATMENT Time 1350 B/P 141/74 HR 88 Temp 97.8 Resp. Rate 18 Post wt Time Medication Dose Volume Route Initials [facility] Signatures Title Initials Time 52 [facility] RN [address] [facility] Signature/Title:_Devonte Gilliam_ Dialyzer cleared: [] Good [] Fair [] Poor    Blood Processed 50 Liters Net UF Removed 3000 mL Post Tx Access: AVF/AVG: Bleeding Stop                   Art. min V min []+bruit/thrill Catheter: Locking Solution heparin     Art. 1.6 ml Varghese 1.6 ml 
 
Post Assessment:  Skin: []Warm []Dry []Diaphoretic []Flushed [] Pale []Cyanotic Lungs: []Clear []Coarse []Crackles []Wheezing Cardiac: []Regular []Irregular  []Monitored rhythm Edema: []None []General []Facial []Pedal  []UE []LE []RIGHT []LEFT    []Bilateral  
   Pain: []0 []1[]2 []3 []4 []5 []6 []7 []8 []9 []10  
POST Tx Note:  
Primary Nurse Report: First initial/Last name/Title Post Dialysis:_JUSTINO Acevedo RN_         Time:_1400_ Abbreviations: AVG-arterial venous graft, AVF-arterial venous fistula, IJ-Internal Jugular, Subcl-Subclavian, Fem-Femoral, Tx-treatment, AP/HR-apical heart rate, DFR-dialysate flow rate, BFR-blood flow rate, AP-arterial pressure, -venous pressure, UF-ultrafiltrate, TMP-transmembrane pressure, Varghese-Venous, Art-Arterial, RO-Reverse Osmosis

## 2018-11-29 LAB
HBV CORE AB SERPL QL IA: NEGATIVE
HBV CORE IGM SER QL: NEGATIVE
HBV CORE IGM SERPL QL IA: NEGATIVE

## 2018-12-07 ENCOUNTER — DOCUMENTATION ONLY (OUTPATIENT)
Dept: FAMILY MEDICINE CLINIC | Age: 53
End: 2018-12-07

## 2018-12-07 NOTE — LETTER
12/7/2018 Bertram Vail  [address] [facility] [address] Dear Mr. Bertram Vail, We had an appointment reserved for you on 12/5/18 and were concerned when you did not show or call within 24 hours to cancel the appointment. Our policy is to call patients two days prior to their appointment to remind them of the date and time. We perform these calls as a courtesy to our patients and to allow us the opportunity to rebook the time slot should the appointment not be necessary. Recognizing that everyones time is valuable and that appointment time is limited, we ask that you provide 24 hours notice if you are unable to keep your appointment. Please call us at your earliest convenience to reschedule your appointment as your provider felt it was important to see you. Thank you for your anticipated cooperation. [facility] [address] [place] [address] 
[phone]

## 2019-05-30 ENCOUNTER — HOSPITAL ENCOUNTER (EMERGENCY)
Age: 54
Discharge: HOME | End: 2019-05-30
Attending: EMERGENCY MEDICINE
Payer: MEDICAID

## 2019-05-30 VITALS
TEMPERATURE: 98.7 F | BODY MASS INDEX: 27.27 KG/M2 | DIASTOLIC BLOOD PRESSURE: 83 MMHG | OXYGEN SATURATION: 98 % | RESPIRATION RATE: 16 BRPM | HEART RATE: 78 BPM | SYSTOLIC BLOOD PRESSURE: 135 MMHG | WEIGHT: 195.55 LBS

## 2019-05-30 DIAGNOSIS — N18.6 ESRD ON HEMODIALYSIS (HCC): ICD-10-CM

## 2019-05-30 DIAGNOSIS — Z99.2 ESRD ON HEMODIALYSIS (HCC): ICD-10-CM

## 2019-05-30 DIAGNOSIS — N39.0 ACUTE UTI: Primary | ICD-10-CM

## 2019-05-30 LAB
ALBUMIN SERPL-MCNC: 3.1 G/DL (ref 3.4–5)
ALBUMIN/GLOB SERPL: 0.6 {RATIO} (ref 0.8–1.7)
ALP SERPL-CCNC: 148 U/L (ref 45–117)
ALT SERPL-CCNC: 13 U/L (ref 16–61)
ANION GAP SERPL CALC-SCNC: 11 MMOL/L (ref 3–18)
APPEARANCE UR: ABNORMAL
AST SERPL-CCNC: 19 U/L (ref 15–37)
BACTERIA URNS QL MICRO: ABNORMAL /HPF
BASOPHILS # BLD: 0 K/UL (ref 0–0.1)
BASOPHILS NFR BLD: 0 % (ref 0–2)
BILIRUB SERPL-MCNC: 0.5 MG/DL (ref 0.2–1)
BILIRUB UR QL: ABNORMAL
BUN SERPL-MCNC: 24 MG/DL (ref 7–18)
BUN/CREAT SERPL: 4 (ref 12–20)
CALCIUM SERPL-MCNC: 8.1 MG/DL (ref 8.5–10.1)
CHLORIDE SERPL-SCNC: 101 MMOL/L (ref 100–108)
CO2 SERPL-SCNC: 26 MMOL/L (ref 21–32)
COLOR UR: ABNORMAL
CREAT SERPL-MCNC: 5.66 MG/DL (ref 0.6–1.3)
DIFFERENTIAL METHOD BLD: ABNORMAL
EOSINOPHIL # BLD: 0.4 K/UL (ref 0–0.4)
EOSINOPHIL NFR BLD: 5 % (ref 0–5)
EPITH CASTS URNS QL MICRO: ABNORMAL /LPF (ref 0–5)
ERYTHROCYTE [DISTWIDTH] IN BLOOD BY AUTOMATED COUNT: 15.9 % (ref 11.6–14.5)
GLOBULIN SER CALC-MCNC: 5.1 G/DL (ref 2–4)
GLUCOSE SERPL-MCNC: 149 MG/DL (ref 74–99)
GLUCOSE UR STRIP.AUTO-MCNC: NEGATIVE MG/DL
HCT VFR BLD AUTO: 36 % (ref 36–48)
HGB BLD-MCNC: 11.7 G/DL (ref 13–16)
HGB UR QL STRIP: ABNORMAL
KETONES UR QL STRIP.AUTO: NEGATIVE MG/DL
LEUKOCYTE ESTERASE UR QL STRIP.AUTO: ABNORMAL
LYMPHOCYTES # BLD: 1 K/UL (ref 0.9–3.6)
LYMPHOCYTES NFR BLD: 13 % (ref 21–52)
MCH RBC QN AUTO: 27.7 PG (ref 24–34)
MCHC RBC AUTO-ENTMCNC: 32.5 G/DL (ref 31–37)
MCV RBC AUTO: 85.1 FL (ref 74–97)
MONOCYTES # BLD: 0.4 K/UL (ref 0.05–1.2)
MONOCYTES NFR BLD: 6 % (ref 3–10)
NEUTS SEG # BLD: 6 K/UL (ref 1.8–8)
NEUTS SEG NFR BLD: 76 % (ref 40–73)
NITRITE UR QL STRIP.AUTO: NEGATIVE
PH UR STRIP: 6 [PH] (ref 5–8)
PLATELET # BLD AUTO: 232 K/UL (ref 135–420)
PMV BLD AUTO: 10.3 FL (ref 9.2–11.8)
POTASSIUM SERPL-SCNC: 4.2 MMOL/L (ref 3.5–5.5)
PROT SERPL-MCNC: 8.2 G/DL (ref 6.4–8.2)
PROT UR STRIP-MCNC: >1000 MG/DL
RBC # BLD AUTO: 4.23 M/UL (ref 4.7–5.5)
RBC #/AREA URNS HPF: ABNORMAL /HPF (ref 0–5)
SODIUM SERPL-SCNC: 138 MMOL/L (ref 136–145)
SP GR UR REFRACTOMETRY: 1.02 (ref 1–1.03)
UROBILINOGEN UR QL STRIP.AUTO: 1 EU/DL (ref 0.2–1)
WBC # BLD AUTO: 7.8 K/UL (ref 4.6–13.2)
WBC URNS QL MICRO: ABNORMAL /HPF (ref 0–4)

## 2019-05-30 PROCEDURE — 96360 HYDRATION IV INFUSION INIT: CPT

## 2019-05-30 PROCEDURE — 80053 COMPREHEN METABOLIC PANEL: CPT

## 2019-05-30 PROCEDURE — 87491 CHLMYD TRACH DNA AMP PROBE: CPT

## 2019-05-30 PROCEDURE — 74011000258 HC RX REV CODE- 258: Performed by: EMERGENCY MEDICINE

## 2019-05-30 PROCEDURE — 74011250637 HC RX REV CODE- 250/637: Performed by: EMERGENCY MEDICINE

## 2019-05-30 PROCEDURE — 81001 URINALYSIS AUTO W/SCOPE: CPT

## 2019-05-30 PROCEDURE — 85025 COMPLETE CBC W/AUTO DIFF WBC: CPT

## 2019-05-30 PROCEDURE — 99284 EMERGENCY DEPT VISIT MOD MDM: CPT

## 2019-05-30 PROCEDURE — 96365 THER/PROPH/DIAG IV INF INIT: CPT

## 2019-05-30 PROCEDURE — 87086 URINE CULTURE/COLONY COUNT: CPT

## 2019-05-30 PROCEDURE — 74011250636 HC RX REV CODE- 250/636: Performed by: EMERGENCY MEDICINE

## 2019-05-30 RX ORDER — HYDROCODONE BITARTRATE AND ACETAMINOPHEN 5; 325 MG/1; MG/1
TABLET ORAL
Status: DISPENSED
Start: 2019-05-30 | End: 2019-05-30

## 2019-05-30 RX ORDER — CEPHALEXIN 500 MG/1
500 CAPSULE ORAL 3 TIMES DAILY
Qty: 30 CAP | Refills: 0 | Status: SHIPPED | OUTPATIENT
Start: 2019-05-30 | End: 2019-06-09

## 2019-05-30 RX ORDER — CEFTRIAXONE 1 G/1
INJECTION, POWDER, FOR SOLUTION INTRAMUSCULAR; INTRAVENOUS
Status: DISPENSED
Start: 2019-05-30 | End: 2019-05-30

## 2019-05-30 RX ORDER — TRAMADOL HYDROCHLORIDE 50 MG/1
50 TABLET ORAL
Qty: 8 TAB | Refills: 0 | Status: SHIPPED | OUTPATIENT
Start: 2019-05-30 | End: 2019-05-30

## 2019-05-30 RX ORDER — HYDROCODONE BITARTRATE AND ACETAMINOPHEN 5; 325 MG/1; MG/1
1 TABLET ORAL
Qty: 6 TAB | Refills: 0 | Status: SHIPPED | OUTPATIENT
Start: 2019-05-30 | End: 2019-06-01

## 2019-05-30 RX ORDER — HYDROCODONE BITARTRATE AND ACETAMINOPHEN 5; 325 MG/1; MG/1
1 TABLET ORAL
Status: COMPLETED | OUTPATIENT
Start: 2019-05-30 | End: 2019-05-30

## 2019-05-30 RX ADMIN — CEFTRIAXONE 1 G: 1 INJECTION, POWDER, FOR SOLUTION INTRAMUSCULAR; INTRAVENOUS at 02:14

## 2019-05-30 RX ADMIN — HYDROCODONE BITARTRATE AND ACETAMINOPHEN 1 TABLET: 5; 325 TABLET ORAL at 02:15

## 2019-05-30 NOTE — ED PROVIDER NOTES
Hannah Ordonez is a 47 y.o. Male with history of end-stage renal disease with complaints of intermittent hematuria for the last couple of days. Patient complains of urgency with dysuria but no penile discharge. There is been no penile swelling or scrotal swelling. He has had no abdominal pain or vomiting. Patient does make urine every day. Patient not been sexually active in over a year. He is not on anticoagulants    The history is provided by the patient. Past Medical History:   Diagnosis Date    CAD (coronary artery disease)     MI x3, 15 stents.  2008    Chronic kidney disease     Diabetes ([facility] [address]     Dx in late 25s    GERD (gastroesophageal reflux disease)     Hypertension        Past Surgical History:   Procedure Laterality Date    CARDIAC SURG PROCEDURE UNLIST  2008    Stents (15)    HX ORTHOPAEDIC      left shoulder manipulation and bone spur removal         Family History:   Problem Relation Age of Onset    Diabetes Mother     Diabetes Father        Social History     Socioeconomic History    Marital status: SINGLE     Spouse name: Not on file    Number of children: Not on file    Years of education: Not on file    Highest education level: Not on file   Occupational History    Not on file   Social Needs    Financial resource strain: Not on file    Food insecurity:     Worry: Not on file     Inability: Not on file    Transportation needs:     Medical: Not on file     Non-medical: Not on file   Tobacco Use    Smoking status: Current Some Day Smoker     Packs/day: 0.25     Years: 13.00     Pack years: 3.25    Smokeless tobacco: Never Used   Substance and Sexual Activity    Alcohol use: No    Drug use: No    Sexual activity: Yes     Partners: Female   Lifestyle    Physical activity:     Days per week: Not on file     Minutes per session: Not on file    Stress: Not on file   Relationships    Social connections:     Talks on phone: Not on file     Gets together: Not on file Attends [employer] service: Not on file     Active member of club or organization: Not on file     Attends meetings of clubs or organizations: Not on file     Relationship status: Not on file    Intimate partner violence:     Fear of current or ex partner: Not on file     Emotionally abused: Not on file     Physically abused: Not on file     Forced sexual activity: Not on file   Other Topics Concern    Not on file   Social History Narrative    Not on file         ALLERGIES: Patient has no known allergies. Review of Systems   Constitutional: Negative for fever. HENT: Negative for sore throat. Respiratory: Negative for shortness of breath. Cardiovascular: Negative for chest pain. Gastrointestinal: Negative for abdominal pain and blood in stool. Genitourinary: Positive for difficulty urinating, dysuria, hematuria and penile pain. Negative for discharge, penile swelling, scrotal swelling and testicular pain. Musculoskeletal: Negative for gait problem. Skin: Negative for rash. Neurological: Negative for speech difficulty. Hematological: Does not bruise/bleed easily. Psychiatric/Behavioral: Positive for sleep disturbance. Vitals:    05/30/19 0112   BP: 135/83   Pulse: 78   Resp: 16   Temp: 98.7 °F (37.1 °C)   SpO2: 98%   Weight: 88.7 kg (195 lb 8.8 oz)            Physical Exam   Constitutional: He is oriented to person, place, and time. He appears well-developed and well-nourished. Non-toxic appearance. He does not appear ill. No distress. HENT:   Head: Normocephalic and atraumatic. Right Ear: External ear normal.   Left Ear: External ear normal.   Nose: Nose normal.   Mouth/Throat: Oropharynx is clear and moist. No oropharyngeal exudate. Eyes: Conjunctivae are normal.   Neck: Normal range of motion. Cardiovascular: Normal rate, regular rhythm, normal heart sounds and intact distal pulses. Pulmonary/Chest: Effort normal and breath sounds normal. No respiratory distress. Abdominal: Soft. There is no tenderness. Hernia confirmed negative in the right inguinal area and confirmed negative in the left inguinal area. Genitourinary: Testes normal. Cremasteric reflex is present. Circumcised. Penile tenderness present. No phimosis, paraphimosis, hypospadias or penile erythema. No discharge found. Musculoskeletal: Normal range of motion. He exhibits no edema. Lymphadenopathy: No inguinal adenopathy noted on the right or left side. Neurological: He is alert and oriented to person, place, and time. Skin: Skin is warm and dry. He is not diaphoretic. Psychiatric: His behavior is normal.   Nursing note and vitals reviewed. MDM       Procedures    Vitals:  Patient Vitals for the past 12 hrs:   Temp Pulse Resp BP SpO2   05/30/19 0112 98.7 °F (37.1 °C) 78 16 135/83 98 %         Medications ordered:   Medications   HYDROcodone-acetaminophen (NORCO) 5-325 mg per tablet 1 Tab (1 Tab Oral Given 5/30/19 0215)   cefTRIAXone (ROCEPHIN) 1 g in 0.9% sodium chloride (MBP/ADV) 50 mL MBP (1 g IntraVENous New Bag 5/30/19 0214)         Lab findings:  Recent Results (from the past 12 hour(s))   CBC WITH AUTOMATED DIFF    Collection Time: 05/30/19  1:50 AM   Result Value Ref Range    WBC 7.8 4.6 - 13.2 K/uL    RBC 4.23 (L) 4.70 - 5.50 M/uL    HGB 11.7 (L) 13.0 - 16.0 g/dL    HCT 36.0 36.0 - 48.0 %    MCV 85.1 74.0 - 97.0 FL    MCH 27.7 24.0 - 34.0 PG    MCHC 32.5 31.0 - 37.0 g/dL    RDW 15.9 (H) 11.6 - 14.5 %    PLATELET 353 684 - 355 K/uL    MPV 10.3 9.2 - 11.8 FL    NEUTROPHILS 76 (H) 40 - 73 %    LYMPHOCYTES 13 (L) 21 - 52 %    MONOCYTES 6 3 - 10 %    EOSINOPHILS 5 0 - 5 %    BASOPHILS 0 0 - 2 %    ABS. NEUTROPHILS 6.0 1.8 - 8.0 K/UL    ABS. LYMPHOCYTES 1.0 0.9 - 3.6 K/UL    ABS. MONOCYTES 0.4 0.05 - 1.2 K/UL    ABS. EOSINOPHILS 0.4 0.0 - 0.4 K/UL    ABS.  BASOPHILS 0.0 0.0 - 0.1 K/UL    DF AUTOMATED     METABOLIC PANEL, COMPREHENSIVE    Collection Time: 05/30/19  1:50 AM   Result Value Ref Range    Sodium 138 136 - 145 mmol/L    Potassium 4.2 3.5 - 5.5 mmol/L    Chloride 101 100 - 108 mmol/L    CO2 26 21 - 32 mmol/L    Anion gap 11 3.0 - 18 mmol/L    Glucose 149 (H) 74 - 99 mg/dL    BUN 24 (H) 7.0 - 18 MG/DL    Creatinine 5.66 (H) 0.6 - 1.3 MG/DL    BUN/Creatinine ratio 4 (L) 12 - 20      GFR est AA 13 (L) >60 ml/min/1.73m2    GFR est non-AA 11 (L) >60 ml/min/1.73m2    Calcium 8.1 (L) 8.5 - 10.1 MG/DL    Bilirubin, total 0.5 0.2 - 1.0 MG/DL    ALT (SGPT) 13 (L) 16 - 61 U/L    AST (SGOT) 19 15 - 37 U/L    Alk. phosphatase 148 (H) 45 - 117 U/L    Protein, total 8.2 6.4 - 8.2 g/dL    Albumin 3.1 (L) 3.4 - 5.0 g/dL    Globulin 5.1 (H) 2.0 - 4.0 g/dL    A-G Ratio 0.6 (L) 0.8 - 1.7     URINALYSIS W/ RFLX MICROSCOPIC    Collection Time: 05/30/19  2:05 AM   Result Value Ref Range    Color SAVAGE      Appearance TURBID      Specific gravity 1.022 1.005 - 1.030      pH (UA) 6.0 5.0 - 8.0      Protein >1,000 (A) NEG mg/dL    Glucose NEGATIVE  NEG mg/dL    Ketone NEGATIVE  NEG mg/dL    Bilirubin SMALL (A) NEG      Blood LARGE (A) NEG      Urobilinogen 1.0 0.2 - 1.0 EU/dL    Nitrites NEGATIVE  NEG      Leukocyte Esterase LARGE (A) NEG     URINE MICROSCOPIC ONLY    Collection Time: 05/30/19  2:05 AM   Result Value Ref Range    WBC TOO NUMEROUS TO COUNT 0 - 4 /hpf    RBC TOO NUMEROUS TO COUNT 0 - 5 /hpf    Epithelial cells FEW 0 - 5 /lpf    Bacteria 2+ (A) NEG /hpf       EKG interpretation by ED Physician:      X-Ray, CT or other radiology findings or impressions:  No orders to display       Progress notes, Consult notes or additional Procedure notes: We will treat for UTI.   I do not feel patient requires imaging or other work-up at this time  I have discussed with patient and/or family/sig other the results, interpretation of any imaging if performed, suspected diagnosis and treatment plan to include instructions regarding the diagnoses listed to which understanding was expressed with all questions answered      Reevaluation of patient:   Stable;    Disposition:  Diagnosis:   1. Acute UTI    2. ESRD on hemodialysis [facility])        Disposition: home    Follow-up Information     Follow up With Specialties Details Why Contact Info    Herlinda Devlin MD Urology Schedule an appointment as soon as possible for a visit  [address] [place] [address] EMERGENCY DEPT Emergency Medicine  If symptoms worsen [address] [facility]  [phone]            Patient's Medications   Start Taking    CEPHALEXIN (KEFLEX) 500 MG CAPSULE    Take 1 Cap by mouth three (3) times daily for 10 days. Continue Taking    AMLODIPINE (NORVASC) 10 MG TABLET    Take 10 mg by mouth daily. ASPIRIN DELAYED-RELEASE (ASPIR-81) 81 MG TABLET    Take 81 mg by mouth daily. ATORVASTATIN (LIPITOR) 80 MG TABLET    Take 80 mg by mouth daily. CARVEDILOL (COREG) 25 MG TABLET    Take 1 Tab by mouth two (2) times daily (with meals). For blood pressure/heart. Replaces metoprolol    CHLORTHALIDONE (HYGROTEN) 25 MG TABLET    Take  by mouth daily. CLOPIDOGREL (PLAVIX) 75 MG TABLET    Take 1 tablet by mouth daily. FENOFIBRATE (LOFIBRA) 54 MG TABLET    Take 54 mg by mouth daily. INSULIN GLARGINE (LANTUS) 100 UNIT/ML INJECTION    70 Units by SubCUTAneous route two (2) times a day. INSULIN LISPRO (HUMALOG) 100 UNIT/ML INJECTION    50 Units by SubCUTAneous route three (3) times daily (with meals). LISINOPRIL (PRINIVIL, ZESTRIL) 40 MG TABLET    Take 1 tablet by mouth daily.     PREGABALIN (LYRICA) 100 MG CAPSULE    TAKE 1 CAPSULE BY MOUTH THREE TIMES DAILY   These Medications have changed    No medications on file   Stop Taking    No medications on file

## 2019-05-31 LAB
BACTERIA SPEC CULT: NORMAL
SERVICE CMNT-IMP: NORMAL

## 2019-06-02 LAB
C TRACH RRNA SPEC QL NAA+PROBE: NEGATIVE
N GONORRHOEA RRNA SPEC QL NAA+PROBE: NEGATIVE
SPECIMEN SOURCE: NORMAL
T VAGINALIS RRNA VAG QL NAA+PROBE: NEGATIVE

## 2019-07-06 ENCOUNTER — HOSPITAL ENCOUNTER (EMERGENCY)
Age: 54
Discharge: HOME | End: 2019-07-06
Attending: EMERGENCY MEDICINE
Payer: MEDICAID

## 2019-07-06 ENCOUNTER — APPOINTMENT (OUTPATIENT)
Dept: CT IMAGING | Age: 54
End: 2019-07-06
Attending: EMERGENCY MEDICINE
Payer: MEDICAID

## 2019-07-06 VITALS
WEIGHT: 185 LBS | OXYGEN SATURATION: 99 % | SYSTOLIC BLOOD PRESSURE: 148 MMHG | HEIGHT: 71 IN | TEMPERATURE: 98.8 F | RESPIRATION RATE: 21 BRPM | HEART RATE: 75 BPM | BODY MASS INDEX: 25.9 KG/M2 | DIASTOLIC BLOOD PRESSURE: 95 MMHG

## 2019-07-06 DIAGNOSIS — R42 LIGHT HEADEDNESS: ICD-10-CM

## 2019-07-06 DIAGNOSIS — R55 SYNCOPE AND COLLAPSE: Primary | ICD-10-CM

## 2019-07-06 DIAGNOSIS — Z99.2 ESRD ON DIALYSIS (HCC): ICD-10-CM

## 2019-07-06 DIAGNOSIS — N18.6 ESRD ON DIALYSIS (HCC): ICD-10-CM

## 2019-07-06 LAB
ABO + RH BLD: NORMAL
ALBUMIN SERPL-MCNC: 3.2 G/DL (ref 3.4–5)
ALBUMIN/GLOB SERPL: 0.6 {RATIO} (ref 0.8–1.7)
ALP SERPL-CCNC: 165 U/L (ref 45–117)
ALT SERPL-CCNC: 14 U/L (ref 16–61)
ANION GAP SERPL CALC-SCNC: 8 MMOL/L (ref 3–18)
AST SERPL-CCNC: 12 U/L (ref 15–37)
BASOPHILS # BLD: 0 K/UL (ref 0–0.1)
BASOPHILS NFR BLD: 1 % (ref 0–2)
BILIRUB SERPL-MCNC: 0.3 MG/DL (ref 0.2–1)
BLOOD GROUP ANTIBODIES SERPL: NORMAL
BUN SERPL-MCNC: 16 MG/DL (ref 7–18)
BUN/CREAT SERPL: 4 (ref 12–20)
CALCIUM SERPL-MCNC: 7.7 MG/DL (ref 8.5–10.1)
CHLORIDE SERPL-SCNC: 100 MMOL/L (ref 100–108)
CK MB CFR SERPL CALC: 2.4 % (ref 0–4)
CK MB SERPL-MCNC: 2.7 NG/ML (ref 5–25)
CK SERPL-CCNC: 114 U/L (ref 39–308)
CO2 SERPL-SCNC: 29 MMOL/L (ref 21–32)
CREAT SERPL-MCNC: 3.76 MG/DL (ref 0.6–1.3)
DIFFERENTIAL METHOD BLD: ABNORMAL
EOSINOPHIL # BLD: 0.2 K/UL (ref 0–0.4)
EOSINOPHIL NFR BLD: 3 % (ref 0–5)
ERYTHROCYTE [DISTWIDTH] IN BLOOD BY AUTOMATED COUNT: 15 % (ref 11.6–14.5)
GLOBULIN SER CALC-MCNC: 5.1 G/DL (ref 2–4)
GLUCOSE BLD STRIP.AUTO-MCNC: 99 MG/DL (ref 70–110)
GLUCOSE SERPL-MCNC: 100 MG/DL (ref 74–99)
HCT VFR BLD AUTO: 36.1 % (ref 36–48)
HGB BLD-MCNC: 11.9 G/DL (ref 13–16)
INR PPP: 1 (ref 0.8–1.2)
LACTATE BLD-SCNC: 1.42 MMOL/L (ref 0.4–2)
LYMPHOCYTES # BLD: 0.9 K/UL (ref 0.9–3.6)
LYMPHOCYTES NFR BLD: 14 % (ref 21–52)
MCH RBC QN AUTO: 27.4 PG (ref 24–34)
MCHC RBC AUTO-ENTMCNC: 33 G/DL (ref 31–37)
MCV RBC AUTO: 83 FL (ref 74–97)
MONOCYTES # BLD: 0.4 K/UL (ref 0.05–1.2)
MONOCYTES NFR BLD: 6 % (ref 3–10)
NEUTS SEG # BLD: 4.9 K/UL (ref 1.8–8)
NEUTS SEG NFR BLD: 76 % (ref 40–73)
PLATELET # BLD AUTO: 246 K/UL (ref 135–420)
PMV BLD AUTO: 10.1 FL (ref 9.2–11.8)
POTASSIUM SERPL-SCNC: 3.6 MMOL/L (ref 3.5–5.5)
PROT SERPL-MCNC: 8.3 G/DL (ref 6.4–8.2)
PROTHROMBIN TIME: 12.8 SEC (ref 11.5–15.2)
RBC # BLD AUTO: 4.35 M/UL (ref 4.7–5.5)
SODIUM SERPL-SCNC: 137 MMOL/L (ref 136–145)
SPECIMEN EXP DATE BLD: NORMAL
TROPONIN I SERPL-MCNC: <0.02 NG/ML (ref 0–0.04)
WBC # BLD AUTO: 6.4 K/UL (ref 4.6–13.2)

## 2019-07-06 PROCEDURE — 96360 HYDRATION IV INFUSION INIT: CPT

## 2019-07-06 PROCEDURE — 85610 PROTHROMBIN TIME: CPT

## 2019-07-06 PROCEDURE — 74011250636 HC RX REV CODE- 250/636: Performed by: EMERGENCY MEDICINE

## 2019-07-06 PROCEDURE — 70450 CT HEAD/BRAIN W/O DYE: CPT

## 2019-07-06 PROCEDURE — 86900 BLOOD TYPING SEROLOGIC ABO: CPT

## 2019-07-06 PROCEDURE — 82550 ASSAY OF CK (CPK): CPT

## 2019-07-06 PROCEDURE — 93005 ELECTROCARDIOGRAM TRACING: CPT

## 2019-07-06 PROCEDURE — 80053 COMPREHEN METABOLIC PANEL: CPT

## 2019-07-06 PROCEDURE — 82962 GLUCOSE BLOOD TEST: CPT

## 2019-07-06 PROCEDURE — 87040 BLOOD CULTURE FOR BACTERIA: CPT

## 2019-07-06 PROCEDURE — 85025 COMPLETE CBC W/AUTO DIFF WBC: CPT

## 2019-07-06 PROCEDURE — 99285 EMERGENCY DEPT VISIT HI MDM: CPT

## 2019-07-06 PROCEDURE — 83605 ASSAY OF LACTIC ACID: CPT

## 2019-07-06 RX ADMIN — SODIUM CHLORIDE 500 ML: 900 INJECTION, SOLUTION INTRAVENOUS at 19:19

## 2019-07-06 NOTE — ED TRIAGE NOTES
Pt brought to ED via EMS c/o witnessed syncopal episode. Pt was sitting in chair, brother left the room for a minute and came back to find pt unconscious in chair. No fall. Pt awoke in approx 5 min alert and oriented x4. Denies N/V or HA.  Pt dialysis pt, had full run today

## 2019-07-06 NOTE — ED PROVIDER NOTES
EMERGENCY DEPARTMENT HISTORY AND PHYSICAL EXAM    6:30 PM      Date: 7/6/2019  Patient Name: Miles Manzano    History of Presenting Illness     Chief Complaint   Patient presents with    Syncope         History Provided By: Patient    Chief Complaint: syncope  Duration:  Minutes  Timing:  Acute  Location: in chair, no pain  Quality: N/A  Severity: N/A  Modifying Factors: None  Associated Symptoms: denies any other associated signs or symptoms      Additional History (Context): Miles Manzano is a 49 y.o. male with HD on ESRD who presents with syncope. Patient was sitting in his chair and brother came with patient passed out. No seizure activity. Patient felt lightheaded prior. Brother was worried that the patient was having a stroke. History of stroke. Patient received dialysis today. He has not eaten all day. States that they took the usual amount of fluids often. PCP: None    Current Facility-Administered Medications   Medication Dose Route Frequency Provider Last Rate Last Dose    sodium chloride 0.9 % bolus infusion 500 mL  500 mL IntraVENous ONCE Josefina Buenrostro DO         Current Outpatient Medications   Medication Sig Dispense Refill    pregabalin (LYRICA) 100 mg capsule TAKE 1 CAPSULE BY MOUTH THREE TIMES DAILY 90 capsule 5    clopidogrel (PLAVIX) 75 mg tablet Take 1 tablet by mouth daily. 30 tablet 3    lisinopril (PRINIVIL, ZESTRIL) 40 mg tablet Take 1 tablet by mouth daily. 30 tablet 3    chlorthalidone (HYGROTEN) 25 mg tablet Take  by mouth daily.  atorvastatin (LIPITOR) 80 mg tablet Take 80 mg by mouth daily.  insulin lispro (HUMALOG) 100 unit/mL injection 50 Units by SubCUTAneous route three (3) times daily (with meals).  carvedilol (COREG) 25 mg tablet Take 1 Tab by mouth two (2) times daily (with meals). For blood pressure/heart. Replaces metoprolol 60 Tab 10    insulin glargine (LANTUS) 100 unit/mL injection 70 Units by SubCUTAneous route two (2) times a day.  5 Vial 11    fenofibrate (LOFIBRA) 54 mg tablet Take 54 mg by mouth daily.  aspirin delayed-release (ASPIR-81) 81 mg tablet Take 81 mg by mouth daily.  amlodipine (NORVASC) 10 mg tablet Take 10 mg by mouth daily. Past History     Past Medical History:  Past Medical History:   Diagnosis Date    CAD (coronary artery disease)     MI x3, 15 stents. 2008    Chronic kidney disease     Diabetes ([address]     Dx in late 25s    GERD (gastroesophageal reflux disease)     Hypertension        Past Surgical History:  Past Surgical History:   Procedure Laterality Date    CARDIAC SURG PROCEDURE UNLIST  2008    Stents (15)    HX ORTHOPAEDIC      left shoulder manipulation and bone spur removal       Family History:  Family History   Problem Relation Age of Onset    Diabetes Mother     Diabetes Father        Social History:  Social History     Tobacco Use    Smoking status: Current Some Day Smoker     Packs/day: 0.25     Years: 13.00     Pack years: 3.25    Smokeless tobacco: Never Used   Substance Use Topics    Alcohol use: No    Drug use: No       Allergies:  No Known Allergies      Review of Systems       Review of Systems   All other systems reviewed and are negative. Physical Exam     Visit Vitals  BP 99/62 (BP 1 Location: Left arm, BP Patient Position: At rest)   Pulse 79   Temp 98.8 °F (37.1 °C)   Resp 16   Ht 5' 11\" (1.803 m)   Wt 83.9 kg (185 lb)   SpO2 98%   BMI 25.80 kg/m²         Physical Exam   Constitutional: He is oriented to person, place, and time. He appears well-developed and well-nourished. No distress. HENT:   Head: Normocephalic and atraumatic. Right Ear: External ear normal.   Left Ear: External ear normal.   Nose: Nose normal.   Dry MM   Eyes: Pupils are equal, round, and reactive to light. Conjunctivae and EOM are normal.   Neck: Normal range of motion. Neck supple. No tracheal deviation present. Cardiovascular: Normal rate, regular rhythm and intact distal pulses. Pulmonary/Chest: Effort normal and breath sounds normal. He exhibits no tenderness. Abdominal: Soft. Bowel sounds are normal. He exhibits no distension. There is no tenderness. There is no rebound and no guarding. Musculoskeletal: Normal range of motion. He exhibits no edema or tenderness. Neurological: He is alert and oriented to person, place, and time. No cranial nerve deficit. Coordination normal.   Patient with chronic right lower extremity weakness. States no worse than usual.   Skin: Skin is warm and dry. Psychiatric: He has a normal mood and affect. His behavior is normal. Judgment and thought content normal.   Nursing note and vitals reviewed. Diagnostic Study Results     Labs -  Recent Results (from the past 12 hour(s))   GLUCOSE, POC    Collection Time: 07/06/19  6:54 PM   Result Value Ref Range    Glucose (POC) 99 70 - 110 mg/dL   POC LACTIC ACID    Collection Time: 07/06/19  6:55 PM   Result Value Ref Range    Lactic Acid (POC) 1.42 0.40 - 2.00 mmol/L       Radiologic Studies -   CT HEAD WO CONT    (Results Pending)         Medical Decision Making   I am the first provider for this patient. I reviewed the vital signs, available nursing notes, past medical history, past surgical history, family history and social history. Vital Signs-Reviewed the patient's vital signs. Pulse Oximetry Analysis -  98 on room air (Interpretation)    Cardiac Monitor:  Rate: 80  Rhythm:  Normal Sinus Rhythm         ED Course: Progress Notes, Reevaluation, and Consults:  6:59 PM  Patient given a low fluid bolus due to hypotension. No neurologic deficits that are new. No pain anywhere. No nausea. Provider Notes (Medical Decision Making):   Patient is 49years old and had an episode of syncope while sitting at home. No chest pain, trouble breathing. No nausea vomiting diarrhea or abdominal pain. Patient will be given a small fluid bolus. Lactic is negative. Imaging and blood work pending. Patient feels better at this time with no complaints currently. 7:00 PM : Pt care transferred to Dr. Brisa Ruffin  ,ED provider. History of patient complaint(s), available diagnostic reports and current treatment plan has been discussed thoroughly. Bedside rounding on patient occured : yes . Intended disposition of patient : TBD  Pending diagnostics reports and/or labs (please list): CT, CXR, labs, re-eval      Diagnosis     Clinical Impression:   1. Syncope and collapse        Disposition: TBD    Follow-up Information    None          Patient's Medications   Start Taking    No medications on file   Continue Taking    AMLODIPINE (NORVASC) 10 MG TABLET    Take 10 mg by mouth daily. ASPIRIN DELAYED-RELEASE (ASPIR-81) 81 MG TABLET    Take 81 mg by mouth daily. ATORVASTATIN (LIPITOR) 80 MG TABLET    Take 80 mg by mouth daily. CARVEDILOL (COREG) 25 MG TABLET    Take 1 Tab by mouth two (2) times daily (with meals). For blood pressure/heart. Replaces metoprolol    CHLORTHALIDONE (HYGROTEN) 25 MG TABLET    Take  by mouth daily. CLOPIDOGREL (PLAVIX) 75 MG TABLET    Take 1 tablet by mouth daily. FENOFIBRATE (LOFIBRA) 54 MG TABLET    Take 54 mg by mouth daily. INSULIN GLARGINE (LANTUS) 100 UNIT/ML INJECTION    70 Units by SubCUTAneous route two (2) times a day. INSULIN LISPRO (HUMALOG) 100 UNIT/ML INJECTION    50 Units by SubCUTAneous route three (3) times daily (with meals). LISINOPRIL (PRINIVIL, ZESTRIL) 40 MG TABLET    Take 1 tablet by mouth daily.     PREGABALIN (LYRICA) 100 MG CAPSULE    TAKE 1 CAPSULE BY MOUTH THREE TIMES DAILY   These Medications have changed    No medications on file   Stop Taking    No medications on file     _______________________________    Attestations:  [address] [facility], DO acting as a scribe for and in the presence of Clovis Mayers DO      July 06, 2019 at 7:00 PM       Provider Attestation:      I personally performed the services described in the documentation, reviewed the documentation, as recorded by the scribe in my presence, and it accurately and completely records my words and actions.  July 06, 2019 at 7:00 PM - Sarita CHAO DO    _______________________________

## 2019-07-07 LAB
ATRIAL RATE: 74 BPM
CALCULATED P AXIS, ECG09: 40 DEGREES
CALCULATED R AXIS, ECG10: 20 DEGREES
CALCULATED T AXIS, ECG11: 50 DEGREES
DIAGNOSIS, 93000: NORMAL
P-R INTERVAL, ECG05: 170 MS
Q-T INTERVAL, ECG07: 464 MS
QRS DURATION, ECG06: 134 MS
QTC CALCULATION (BEZET), ECG08: 515 MS
VENTRICULAR RATE, ECG03: 74 BPM

## 2019-07-07 NOTE — ED NOTES
Pt states he has not eaten all day, per provider OK to feed pt at this time.  Patient provided with snacks, sitting up eating in stretcher in NAD

## 2019-07-07 NOTE — ED NOTES
I received pt in sign-out from Dr. Symone Mendez. Pt with ESRD on dialysis, had dialysis today and then had possible syncopal episode while getting hair cut on porch. Pt initially with somewhat low BP but given some IVF in ED and states he feels much better and is now ready to go home. Discussed results and poc for dc home, symptom management, follow-up, return precautions.     Visit Vitals  BP (!) 148/95   Pulse 75   Temp 98.8 °F (37.1 °C)   Resp 21   Ht 5' 11\" (1.803 m)   Wt 83.9 kg (185 lb)   SpO2 99%   BMI 25.80 kg/m²

## 2019-07-12 LAB
BACTERIA SPEC CULT: NORMAL
SERVICE CMNT-IMP: NORMAL

## 2020-01-24 ENCOUNTER — HOSPITAL ENCOUNTER (OUTPATIENT)
Dept: VASCULAR SURGERY | Age: 55
Discharge: HOME | End: 2020-01-24
Attending: SINGLE SPECIALTY
Payer: MEDICAID

## 2020-01-24 DIAGNOSIS — R25.2 CRAMP OF LIMB: ICD-10-CM

## 2020-01-24 LAB
LEFT ABI: 1.15
LEFT ANTERIOR TIBIAL: 162 MMHG
LEFT ARM BP: 142 MMHG
LEFT POSTERIOR TIBIAL: 166 MMHG
LEFT TBI: 0.78
LEFT TOE PRESSURE: 113 MMHG
RIGHT ABI: 1.19
RIGHT ANTERIOR TIBIAL: 149 MMHG
RIGHT ARM BP: 144 MMHG
RIGHT POSTERIOR TIBIAL: 171 MMHG
RIGHT TBI: 0.76
RIGHT TOE PRESSURE: 109 MMHG

## 2020-01-24 PROCEDURE — 93923 UPR/LXTR ART STDY 3+ LVLS: CPT

## 2020-03-21 ENCOUNTER — HOSPITAL ENCOUNTER (EMERGENCY)
Age: 55
Discharge: HOME | End: 2020-03-21
Attending: EMERGENCY MEDICINE
Payer: MEDICAID

## 2020-03-21 VITALS
SYSTOLIC BLOOD PRESSURE: 141 MMHG | WEIGHT: 196 LBS | BODY MASS INDEX: 27.44 KG/M2 | OXYGEN SATURATION: 100 % | DIASTOLIC BLOOD PRESSURE: 86 MMHG | TEMPERATURE: 98.7 F | HEIGHT: 71 IN | RESPIRATION RATE: 20 BRPM | HEART RATE: 75 BPM

## 2020-03-21 DIAGNOSIS — M79.2 NEUROPATHIC PAIN: Primary | ICD-10-CM

## 2020-03-21 DIAGNOSIS — Z91.199 PATIENT'S NONCOMPLIANCE WITH OTHER MEDICAL TREATMENT AND REGIMEN: ICD-10-CM

## 2020-03-21 PROCEDURE — 74011250637 HC RX REV CODE- 250/637: Performed by: EMERGENCY MEDICINE

## 2020-03-21 PROCEDURE — 99285 EMERGENCY DEPT VISIT HI MDM: CPT

## 2020-03-21 RX ORDER — HYDROMORPHONE HYDROCHLORIDE 1 MG/ML
1 INJECTION, SOLUTION INTRAMUSCULAR; INTRAVENOUS; SUBCUTANEOUS ONCE
Status: DISCONTINUED | OUTPATIENT
Start: 2020-03-21 | End: 2020-03-21

## 2020-03-21 RX ORDER — CLOPIDOGREL BISULFATE 75 MG/1
75 TABLET ORAL DAILY
Qty: 30 TAB | Refills: 3 | Status: SHIPPED | OUTPATIENT
Start: 2020-03-21

## 2020-03-21 RX ORDER — PREGABALIN 100 MG/1
CAPSULE ORAL
Qty: 90 CAP | Refills: 5 | Status: SHIPPED | OUTPATIENT
Start: 2020-03-21

## 2020-03-21 RX ORDER — AMLODIPINE BESYLATE 10 MG/1
10 TABLET ORAL DAILY
Qty: 90 TAB | Refills: 1 | Status: SHIPPED | OUTPATIENT
Start: 2020-03-21

## 2020-03-21 RX ORDER — FENOFIBRATE 54 MG/1
54 TABLET ORAL DAILY
Qty: 90 TAB | Refills: 1 | Status: SHIPPED | OUTPATIENT
Start: 2020-03-21

## 2020-03-21 RX ORDER — INSULIN LISPRO 100 [IU]/ML
40 INJECTION, SOLUTION INTRAVENOUS; SUBCUTANEOUS DAILY
Qty: 1 VIAL | Refills: 0 | Status: SHIPPED | OUTPATIENT
Start: 2020-03-21 | End: 2020-08-03

## 2020-03-21 RX ORDER — ASPIRIN 81 MG/1
81 TABLET ORAL DAILY
Qty: 90 TAB | Refills: 1 | Status: SHIPPED | OUTPATIENT
Start: 2020-03-21

## 2020-03-21 RX ORDER — CARVEDILOL 25 MG/1
25 TABLET ORAL 2 TIMES DAILY WITH MEALS
Qty: 60 TAB | Refills: 10 | Status: SHIPPED | OUTPATIENT
Start: 2020-03-21

## 2020-03-21 RX ORDER — ATORVASTATIN CALCIUM 80 MG/1
80 TABLET, FILM COATED ORAL DAILY
Qty: 90 TAB | Refills: 1 | Status: SHIPPED | OUTPATIENT
Start: 2020-03-21 | End: 2020-09-17

## 2020-03-21 RX ADMIN — PREGABALIN 200 MG: 75 CAPSULE ORAL at 09:35

## 2020-03-21 NOTE — ED PROVIDER NOTES
EMERGENCY DEPARTMENT HISTORY AND PHYSICAL EXAM      Date: 3/21/2020  Patient Name: Estevan Garrett    History of Presenting Illness     Chief Complaint   Patient presents with    Arm Pain    Extremity Weakness       History (Context): Estevan Garertt is a 49 y.o. gentleman with history of a CVA with right-sided deficits, who presents with 2 days of subacute onset, persistent, progressive, severe right arm and right leg burning and pins-and-needles sensation. The patient states \"I cannot tell if I am getting weak because is hard to feel my hand and leg. \"  The patient stopped taking his Lyrica 3 days ago because \"it does not work. \"    On review of systems, the patient denies chest pain, shortness of breath, fever, chills, rashes, vision changes, falls, head trauma, headache, other numbness/weakness/tingling. PCP: None    Current Facility-Administered Medications   Medication Dose Route Frequency Provider Last Rate Last Dose    pregabalin (LYRICA) capsule 200 mg  200 mg Oral ONCE Susan Lott MD         Current Outpatient Medications   Medication Sig Dispense Refill    pregabalin (LYRICA) 100 mg capsule TAKE 1 CAPSULE BY MOUTH THREE TIMES DAILY 90 capsule 5    clopidogrel (PLAVIX) 75 mg tablet Take 1 tablet by mouth daily. 30 tablet 3    lisinopril (PRINIVIL, ZESTRIL) 40 mg tablet Take 1 tablet by mouth daily. 30 tablet 3    chlorthalidone (HYGROTEN) 25 mg tablet Take  by mouth daily.  atorvastatin (LIPITOR) 80 mg tablet Take 80 mg by mouth daily.  insulin lispro (HUMALOG) 100 unit/mL injection 50 Units by SubCUTAneous route three (3) times daily (with meals).  carvedilol (COREG) 25 mg tablet Take 1 Tab by mouth two (2) times daily (with meals). For blood pressure/heart. Replaces metoprolol 60 Tab 10    insulin glargine (LANTUS) 100 unit/mL injection 70 Units by SubCUTAneous route two (2) times a day. 5 Vial 11    fenofibrate (LOFIBRA) 54 mg tablet Take 54 mg by mouth daily.       aspirin delayed-release (ASPIR-81) 81 mg tablet Take 81 mg by mouth daily.  amlodipine (NORVASC) 10 mg tablet Take 10 mg by mouth daily. Past History     Past Medical History:  Past Medical History:   Diagnosis Date    CAD (coronary artery disease)     MI x3, 15 stents. 2008    Chronic kidney disease     Diabetes ([facility] [address]     Dx in late 25s    GERD (gastroesophageal reflux disease)     Hypertension        Past Surgical History:  Past Surgical History:   Procedure Laterality Date    CARDIAC SURG PROCEDURE UNLIST  2008    Stents (15)    HX ORTHOPAEDIC      left shoulder manipulation and bone spur removal       Family History:  Family History   Problem Relation Age of Onset    Diabetes Mother     Diabetes Father        Social History:  Social History     Tobacco Use    Smoking status: Current Some Day Smoker     Packs/day: 0.25     Years: 13.00     Pack years: 3.25    Smokeless tobacco: Never Used   Substance Use Topics    Alcohol use: No    Drug use: No       Allergies:  No Known Allergies    PMH, PSH, family history, social history, allergies reviewed with the patient with significant items noted above. Review of Systems   As per HPI, otherwise reviewed and negative. Physical Exam     Vitals:    03/21/20 0845   BP: 154/76   Pulse: 77   Resp: 18   Temp: 98.7 °F (37.1 °C)   SpO2: 98%   Weight: 88.9 kg (196 lb)   Height: 5' 11\" (1.803 m)       Gen: No acute distress  HEENT: Normocephalic, sclera anicteric  Cardiovascular: Normal rate, regular rhythm, no murmurs, rubs, gallops. Pulses intact and equal distally. Pulmonary: No respiratory distress. No stridor. Clear lungs. ABD: Soft, nontender, nondistended.   Neuro: Alert and oriented x3, cranial nerves II through XII intact, pupils equal round reactive to light, normal facial symmetry, full strength and sensation throughout left side, right side arm, hand, leg have full strength, although sensation is grossly diminished, 2+ reflexes in the bilateral patella, no Babinski, normal gait, rapid alternating motions normal, normal finger-nose-finger  Psych: Normal thought content and thought processes. : No CVA tenderness  EXT: Moves all extremities well. No cyanosis or clubbing. Skin: Warm and well-perfused. Other:        Diagnostic Study Results     Labs -   No results found for this or any previous visit (from the past 12 hour(s)). Radiologic Studies -   No orders to display     CT Results  (Last 48 hours)    None        CXR Results  (Last 48 hours)    None            Medical Decision Making   I am the first provider for this patient. I reviewed the vital signs, available nursing notes, past medical history, past surgical history, family history and social history. Vital Signs-Reviewed the patient's vital signs. Records Reviewed: Personally, on initial evaluation    MDM:   Patient presents with neuropathic pain secondary to not taking his pregabalin. He stopped taking the Lyrica 3 days ago because he thought it did not work, and the pain started the next day. The patient strength is full. No need for additional work-up at this time. The patient has been instructed to continue his Lyrica. I will give him a dose here in the emergency room. The patient is due for dialysis in 2 hours. We will assist the patient in getting transport there. Orders as below:  Orders Placed This Encounter    DISCONTD: HYDROmorphone (DILAUDID) injection 1 mg    pregabalin (LYRICA) capsule 200 mg        ED Course:   Patient stable throughout ED course  DISCHARGE NOTE:    Care plan outlined and precautions discussed. Results were reviewed with the patient. All medications were reviewed with the patient; will d/c home with continuation of Lyrica. All of pt's questions and concerns were addressed. Alarm symptoms and return precautions associated with chief complaint and evaluation were reviewed with the patient in detail.   The patient demonstrated adequate understanding. Patient was instructed and agrees to follow up with PCP and neurology, as well as to return to the ED upon further deterioration. Patient is ready to go home. The patient is happy with this plan    Follow-up Information     Follow up With Specialties Details Why [facility] EMERGENCY DEPT Emergency Medicine  As needed, If symptoms worsen [address]  [phone]          Current Discharge Medication List          Procedures:  Procedures      Critical Care Time:       Diagnosis     Clinical Impression:   1. Neuropathic pain    2. Patient's noncompliance with other medical treatment and regimen        Signed,  Nikki Milian MD  Emergency Physician  [facility]    As a voice dictation software was utilized to dictate this note, minor word transpositions can occur. I apologize for confusing wording and typographic errors. Please feel free to contact me for clarification.

## 2020-03-21 NOTE — ED TRIAGE NOTES
Pt arrived via EMS from home with a report of right arm and leg burning. Pt has a history of CVA with right sided weakness and states he noticed burning to his right arm and leg with worsening numbness to this side 2 days ago. Pt denies any injury. Pt also states he is on dialysis and was due to have dialysis today at 1100.

## 2020-03-21 NOTE — DISCHARGE INSTRUCTIONS
Patient Education      Please restart your Lyrica today. This will help the pain. Neuropathic Pain: Care Instructions  Your Care Instructions    Neuropathic pain is caused by pressure on or damage to your nerves. It's often simply called nerve pain. Some people feel this type of pain all the time. For others, it comes and goes. Diabetes, shingles, or an injury can cause nerve pain. Many people say the pain feels sharp, burning, or stabbing. But some people feel it as a dull ache. In some cases, it makes your skin very sensitive. So touch, pressure, and other sensations that did not hurt before may now cause pain. It's important to know that this kind of pain is real and can affect your quality of life. It's also important to know that treatment can help. Treatment includes pain medicines, exercise, and physical therapy. Medicines can help reduce the number of pain signals that travel over the nerves. This can make the painful areas less sensitive. It can also help you sleep better and improve your mood. But medicines are only one part of successful treatment. Most people do best with more than one kind of treatment. Your doctor may recommend that you try cognitive-behavioral therapy and stress management. Or, if needed, you may decide to try to quit smoking, lower your blood pressure, or better control blood sugar. These kinds of healthy changes can also make a difference. If you feel that your treatment is not working, talk to your doctor. And be sure to tell your doctor if you think you might be depressed or anxious. These are common problems that can also be treated. Follow-up care is a key part of your treatment and safety. Be sure to make and go to all appointments, and call your doctor if you are having problems. It's also a good idea to know your test results and keep a list of the medicines you take. How can you care for yourself at home? · Be safe with medicines.  Read and follow all instructions on the label. ? If the doctor gave you a prescription medicine for pain, take it as prescribed. ? If you are not taking a prescription pain medicine, ask your doctor if you can take an over-the-counter medicine. · Save hard tasks for days when you have less pain. Follow a hard task with an easy task. And remember to take breaks. · Relax, and reduce stress. You may want to try deep breathing or meditation. These can help. · Keep moving. Gentle, daily exercise can help reduce pain. Your doctor or physical therapist can tell you what type of exercise is best for you. This may include walking, swimming, and stationary biking. It may also include stretches and range-of-motion exercises. · Try heat, cold packs, and massage. · Get enough sleep. Constant pain can make you more tired. If the pain makes it hard to sleep, talk with your doctor. · Think positively. Your thoughts can affect your pain. Do fun things to distract yourself from the pain. See a movie, read a book, listen to music, or spend time with a friend. · Keep a pain diary. Try to write down how strong your pain is and what it feels like. Also try to notice and write down how your moods, thoughts, sleep, activities, and medicine affect your pain. These notes can help you and your doctor find the best ways to treat your pain. Reducing constipation caused by pain medicine  Pain medicines often cause constipation. To reduce constipation:  · Include fruits, vegetables, beans, and whole grains in your diet each day. These foods are high in fiber. · Drink plenty of fluids, enough so that your urine is light yellow or clear like water. If you have kidney, heart, or liver disease and have to limit fluids, talk with your doctor before you increase the amount of fluids you drink. · Get some exercise every day. Build up slowly to 30 to 60 minutes a day on 5 or more days of the week.   · Take a fiber supplement, such as Citrucel or Metamucil, every day if needed. Read and follow all instructions on the label. · Schedule time each day for a bowel movement. Having a daily routine may help. Take your time and do not strain when having a bowel movement. · Ask your doctor about a laxative. The goal is to have one easy bowel movement every 1 to 2 days. Do not let constipation go untreated for more than 3 days. When should you call for help? Call your doctor now or seek immediate medical care if:    · You feel sad, anxious, or hopeless for more than a few days. This could mean you are depressed. Depression is common in people who have a lot of pain. But it can be treated.     · You have trouble with bowel movements, such as:  ? No bowel movement in 3 days. ? Blood in the anal area, in your stool, or on the toilet paper. ? Diarrhea for more than 24 hours.    Watch closely for changes in your health, and be sure to contact your doctor if:    · Your pain is getting worse.     · You can't sleep because of pain.     · You are very worried or anxious about your pain.     · You have trouble taking your pain medicine.     · You have any concerns about your pain medicine or its side effects.     · You have vomiting or cramps for more than 2 hours. Where can you learn more? Go to http://nato-kenneth.info/  Enter B487 in the search box to learn more about \"Neuropathic Pain: Care Instructions. \"  Current as of: November 19, 2019Content Version: 12.4  © 8285-8174 [employer]. Care instructions adapted under license by [employer] (which disclaims liability or warranty for this information). If you have questions about a medical condition or this instruction, always ask your healthcare professional. [place] [address] any warranty or liability for your use of this information.

## 2020-03-23 ENCOUNTER — PATIENT OUTREACH (OUTPATIENT)
Dept: INTERNAL MEDICINE CLINIC | Age: 55
End: 2020-03-23

## 2020-03-23 NOTE — PROGRESS NOTES
Attempted to contact patient via phone for ACM transition of care. No message left.  No voice mail available due to not being set up, mail box full or busy signal. Will contact patient at a later date

## 2020-03-27 ENCOUNTER — PATIENT OUTREACH (OUTPATIENT)
Dept: INTERNAL MEDICINE CLINIC | Age: 55
End: 2020-03-27

## 2020-03-27 NOTE — PROGRESS NOTES
Patient contacted regarding recent discharge and COVID-19 risk     Care Transition Nurse/ Ambulatory Care Manager contacted the patient by telephone to perform post discharge assessment. Verified name and  with patient as identifiers. Patient has following risk factors of: Diabetes    CTN/ACM reviewed discharge instructions, medical action plan and red flags related to discharge diagnosis. Reviewed and educated them on any new and changed medications related to discharge diagnosis. Advised obtaining a 90-day supply of all daily and as-needed medications. Education provided regarding infection prevention, and signs and symptoms of COVID-19 and when to seek medical attention with patient who verbalized understanding. Discussed exposure protocols and quarantine from [address] you at higher risk for severe illness  and given an opportunity for questions and concerns. The patient agrees to contact the COVID-19 hotline [phone] or PCP office for questions related to their healthcare. CTN/ACM provided contact information for future reference. From CDC: Are you at higher risk for severe illness?  Wash your hands often.  Avoid close contact (6 feet, which is about two arm lengths) with people who are sick.  Put distance between yourself and other people if COVID-19 is spreading in your community.  Clean and disinfect frequently touched surfaces.  Avoid all cruise travel and non-essential air travel.  Call your healthcare professional if you have concerns about COVID-19 and your underlying condition or if you are sick.     For more information on steps you can take to protect yourself, see CDC's How to Protect Yourself

## 2020-04-06 ENCOUNTER — PATIENT OUTREACH (OUTPATIENT)
Dept: INTERNAL MEDICINE CLINIC | Age: 55
End: 2020-04-06

## 2020-04-06 NOTE — PROGRESS NOTES
COVID-19 Screening Discharge Note    Patient contacted regarding COVID-19  risk. Care Transition Nurse/ Ambulatory Care Manager contacted the patient by telephone to perform post discharge assessment. Verified name and  with patient as identifiers. Provided introduction to self, and explanation of the CTN/ACM role, and reason for call due to risk factors for infection and/or exposure to COVID-19. Symptoms reviewed with patient who verbalized the following symptoms: no new/worsening symptoms       Due to no new or worsening symptoms encounter was not routed to provider for escalation. Patient has following risk factors of: diabetes. CTN/ACM reviewed discharge instructions, medical action plan and red flags such as increased shortness of breath, increasing fever and signs of decompensation with patient who verbalized understanding. Discussed exposure protocols and quarantine with CDC Guidelines What to do if you are sick with coronavirus disease 2019 Patient who was given an opportunity for questions and concerns. The patient agrees to contact the [facility] exposure line [phone], [facility]  ([phone] and PCP office for questions related to their healthcare. CTN/ACM provided contact information for future reference. Reviewed and educated patient on any new and changed medications related to discharge diagnosis     Patient/family/caregiver given information for [employer] Loop and agrees to enroll no    Patient's preferred e-mail:      Based on Loop alert triggers, patient will be contacted by nurse care manager for worsening symptoms.     Plan for discharge from [facility] based on status of symptoms and risk factors

## 2020-07-31 ENCOUNTER — HOSPITAL ENCOUNTER (INPATIENT)
Age: 55
LOS: 3 days | Discharge: HOME HEALTH | DRG: 314 | End: 2020-08-03
Attending: EMERGENCY MEDICINE | Admitting: HOSPITALIST
Payer: MEDICAID

## 2020-07-31 ENCOUNTER — APPOINTMENT (OUTPATIENT)
Dept: GENERAL RADIOLOGY | Age: 55
DRG: 314 | End: 2020-07-31
Attending: EMERGENCY MEDICINE
Payer: MEDICAID

## 2020-07-31 DIAGNOSIS — M86.9 OSTEOMYELITIS OF GREAT TOE OF RIGHT FOOT (HCC): Primary | ICD-10-CM

## 2020-07-31 PROBLEM — L08.9 INFECTION OF TOE: Status: ACTIVE | Noted: 2020-07-31

## 2020-07-31 PROBLEM — N18.6 ESRD (END STAGE RENAL DISEASE) (HCC): Status: ACTIVE | Noted: 2020-07-31

## 2020-07-31 PROBLEM — I10 HTN (HYPERTENSION): Status: ACTIVE | Noted: 2020-07-31

## 2020-07-31 LAB
ALBUMIN SERPL-MCNC: 3.2 G/DL (ref 3.4–5)
ALBUMIN/GLOB SERPL: 0.5 {RATIO} (ref 0.8–1.7)
ALP SERPL-CCNC: 92 U/L (ref 45–117)
ALT SERPL-CCNC: 12 U/L (ref 16–61)
ANION GAP SERPL CALC-SCNC: 9 MMOL/L (ref 3–18)
AST SERPL-CCNC: 26 U/L (ref 10–38)
BASOPHILS # BLD: 0 K/UL (ref 0–0.1)
BASOPHILS NFR BLD: 1 % (ref 0–2)
BILIRUB SERPL-MCNC: 0.7 MG/DL (ref 0.2–1)
BUN SERPL-MCNC: 48 MG/DL (ref 7–18)
BUN/CREAT SERPL: 6 (ref 12–20)
CALCIUM SERPL-MCNC: 8.8 MG/DL (ref 8.5–10.1)
CHLORIDE SERPL-SCNC: 101 MMOL/L (ref 100–111)
CK MB CFR SERPL CALC: 1.5 % (ref 0–4)
CK MB SERPL-MCNC: 7.8 NG/ML (ref 5–25)
CK SERPL-CCNC: 509 U/L (ref 39–308)
CO2 SERPL-SCNC: 21 MMOL/L (ref 21–32)
CREAT SERPL-MCNC: 7.67 MG/DL (ref 0.6–1.3)
DIFFERENTIAL METHOD BLD: ABNORMAL
EOSINOPHIL # BLD: 0.3 K/UL (ref 0–0.4)
EOSINOPHIL NFR BLD: 3 % (ref 0–5)
ERYTHROCYTE [DISTWIDTH] IN BLOOD BY AUTOMATED COUNT: 14.8 % (ref 11.6–14.5)
GLOBULIN SER CALC-MCNC: 6.3 G/DL (ref 2–4)
GLUCOSE SERPL-MCNC: 90 MG/DL (ref 74–99)
HCT VFR BLD AUTO: 30.5 % (ref 36–48)
HGB BLD-MCNC: 10.2 G/DL (ref 13–16)
LYMPHOCYTES # BLD: 1.1 K/UL (ref 0.9–3.6)
LYMPHOCYTES NFR BLD: 13 % (ref 21–52)
MAGNESIUM SERPL-MCNC: 2.4 MG/DL (ref 1.6–2.6)
MCH RBC QN AUTO: 27.9 PG (ref 24–34)
MCHC RBC AUTO-ENTMCNC: 33.4 G/DL (ref 31–37)
MCV RBC AUTO: 83.3 FL (ref 74–97)
MONOCYTES # BLD: 0.6 K/UL (ref 0.05–1.2)
MONOCYTES NFR BLD: 7 % (ref 3–10)
NEUTS SEG # BLD: 6.3 K/UL (ref 1.8–8)
NEUTS SEG NFR BLD: 76 % (ref 40–73)
PLATELET # BLD AUTO: 286 K/UL (ref 135–420)
PMV BLD AUTO: 10.3 FL (ref 9.2–11.8)
POTASSIUM SERPL-SCNC: 4.4 MMOL/L (ref 3.5–5.5)
PROT SERPL-MCNC: 9.5 G/DL (ref 6.4–8.2)
RBC # BLD AUTO: 3.66 M/UL (ref 4.7–5.5)
SODIUM SERPL-SCNC: 131 MMOL/L (ref 136–145)
TROPONIN I SERPL-MCNC: 0.04 NG/ML (ref 0–0.04)
WBC # BLD AUTO: 8.3 K/UL (ref 4.6–13.2)

## 2020-07-31 PROCEDURE — 93005 ELECTROCARDIOGRAM TRACING: CPT

## 2020-07-31 PROCEDURE — 65270000029 HC RM PRIVATE

## 2020-07-31 PROCEDURE — 80053 COMPREHEN METABOLIC PANEL: CPT

## 2020-07-31 PROCEDURE — 86141 C-REACTIVE PROTEIN HS: CPT

## 2020-07-31 PROCEDURE — 85025 COMPLETE CBC W/AUTO DIFF WBC: CPT

## 2020-07-31 PROCEDURE — 99285 EMERGENCY DEPT VISIT HI MDM: CPT

## 2020-07-31 PROCEDURE — 87040 BLOOD CULTURE FOR BACTERIA: CPT

## 2020-07-31 PROCEDURE — 85652 RBC SED RATE AUTOMATED: CPT

## 2020-07-31 PROCEDURE — 74011250636 HC RX REV CODE- 250/636: Performed by: EMERGENCY MEDICINE

## 2020-07-31 PROCEDURE — 83735 ASSAY OF MAGNESIUM: CPT

## 2020-07-31 PROCEDURE — 74011000258 HC RX REV CODE- 258: Performed by: EMERGENCY MEDICINE

## 2020-07-31 PROCEDURE — 82550 ASSAY OF CK (CPK): CPT

## 2020-07-31 PROCEDURE — 73630 X-RAY EXAM OF FOOT: CPT

## 2020-07-31 PROCEDURE — 83036 HEMOGLOBIN GLYCOSYLATED A1C: CPT

## 2020-07-31 RX ORDER — HYDROMORPHONE HYDROCHLORIDE 1 MG/ML
1 INJECTION, SOLUTION INTRAMUSCULAR; INTRAVENOUS; SUBCUTANEOUS ONCE
Status: COMPLETED | OUTPATIENT
Start: 2020-07-31 | End: 2020-07-31

## 2020-07-31 RX ADMIN — PIPERACILLIN AND TAZOBACTAM 3.38 G: 3; .375 INJECTION, POWDER, LYOPHILIZED, FOR SOLUTION INTRAVENOUS at 23:37

## 2020-07-31 RX ADMIN — HYDROMORPHONE HYDROCHLORIDE 1 MG: 1 INJECTION, SOLUTION INTRAMUSCULAR; INTRAVENOUS; SUBCUTANEOUS at 23:37

## 2020-07-31 NOTE — ED TRIAGE NOTES
Pt arrives via EMS with c/o chest pain and infected right great toe. Pt states the toe has been giving him issues for about 3 weeks. Pt has a Podiatrist but doesn't think she is managing it well. Pt with ESRD and dialysis [place], and Saturday.

## 2020-08-01 LAB
ANION GAP SERPL CALC-SCNC: 10 MMOL/L (ref 3–18)
BUN SERPL-MCNC: 49 MG/DL (ref 7–18)
BUN/CREAT SERPL: 6 (ref 12–20)
CALCIUM SERPL-MCNC: 8.3 MG/DL (ref 8.5–10.1)
CHLORIDE SERPL-SCNC: 101 MMOL/L (ref 100–111)
CO2 SERPL-SCNC: 23 MMOL/L (ref 21–32)
CREAT SERPL-MCNC: 7.64 MG/DL (ref 0.6–1.3)
CRP SERPL HS-MCNC: >9.5 MG/L
ERYTHROCYTE [DISTWIDTH] IN BLOOD BY AUTOMATED COUNT: 15 % (ref 11.6–14.5)
ERYTHROCYTE [SEDIMENTATION RATE] IN BLOOD: 86 MM/HR (ref 0–20)
EST. AVERAGE GLUCOSE BLD GHB EST-MCNC: 126 MG/DL
GLUCOSE BLD STRIP.AUTO-MCNC: 108 MG/DL (ref 70–110)
GLUCOSE BLD STRIP.AUTO-MCNC: 65 MG/DL (ref 70–110)
GLUCOSE BLD STRIP.AUTO-MCNC: 76 MG/DL (ref 70–110)
GLUCOSE BLD STRIP.AUTO-MCNC: 87 MG/DL (ref 70–110)
GLUCOSE BLD STRIP.AUTO-MCNC: 97 MG/DL (ref 70–110)
GLUCOSE SERPL-MCNC: 87 MG/DL (ref 74–99)
HBA1C MFR BLD: 6 % (ref 4.2–5.6)
HBV SURFACE AG SER QL: <0.1 INDEX
HBV SURFACE AG SER QL: NEGATIVE
HCT VFR BLD AUTO: 32.3 % (ref 36–48)
HGB BLD-MCNC: 10.5 G/DL (ref 13–16)
MCH RBC QN AUTO: 27.5 PG (ref 24–34)
MCHC RBC AUTO-ENTMCNC: 32.5 G/DL (ref 31–37)
MCV RBC AUTO: 84.6 FL (ref 74–97)
PLATELET # BLD AUTO: 296 K/UL (ref 135–420)
PMV BLD AUTO: 10.3 FL (ref 9.2–11.8)
POTASSIUM SERPL-SCNC: 4 MMOL/L (ref 3.5–5.5)
RBC # BLD AUTO: 3.82 M/UL (ref 4.7–5.5)
SODIUM SERPL-SCNC: 134 MMOL/L (ref 136–145)
WBC # BLD AUTO: 7.5 K/UL (ref 4.6–13.2)

## 2020-08-01 PROCEDURE — 74011636637 HC RX REV CODE- 636/637: Performed by: HOSPITALIST

## 2020-08-01 PROCEDURE — 65270000029 HC RM PRIVATE

## 2020-08-01 PROCEDURE — 74011250636 HC RX REV CODE- 250/636

## 2020-08-01 PROCEDURE — 82962 GLUCOSE BLOOD TEST: CPT

## 2020-08-01 PROCEDURE — 85027 COMPLETE CBC AUTOMATED: CPT

## 2020-08-01 PROCEDURE — 74011250637 HC RX REV CODE- 250/637: Performed by: HOSPITALIST

## 2020-08-01 PROCEDURE — 87340 HEPATITIS B SURFACE AG IA: CPT

## 2020-08-01 PROCEDURE — 5A1D70Z PERFORMANCE OF URINARY FILTRATION, INTERMITTENT, LESS THAN 6 HOURS PER DAY: ICD-10-PCS | Performed by: INTERNAL MEDICINE

## 2020-08-01 PROCEDURE — 86706 HEP B SURFACE ANTIBODY: CPT

## 2020-08-01 PROCEDURE — 74011000258 HC RX REV CODE- 258: Performed by: HOSPITALIST

## 2020-08-01 PROCEDURE — 74011250636 HC RX REV CODE- 250/636: Performed by: HOSPITALIST

## 2020-08-01 PROCEDURE — 80048 BASIC METABOLIC PNL TOTAL CA: CPT

## 2020-08-01 PROCEDURE — 90935 HEMODIALYSIS ONE EVALUATION: CPT

## 2020-08-01 PROCEDURE — 36415 COLL VENOUS BLD VENIPUNCTURE: CPT

## 2020-08-01 PROCEDURE — 74011250636 HC RX REV CODE- 250/636: Performed by: EMERGENCY MEDICINE

## 2020-08-01 RX ORDER — ACETAMINOPHEN 325 MG/1
650 TABLET ORAL
Status: DISCONTINUED | OUTPATIENT
Start: 2020-08-01 | End: 2020-08-03 | Stop reason: HOSPADM

## 2020-08-01 RX ORDER — ATORVASTATIN CALCIUM 80 MG/1
80 TABLET, FILM COATED ORAL DAILY
Status: DISCONTINUED | OUTPATIENT
Start: 2020-08-01 | End: 2020-08-03 | Stop reason: HOSPADM

## 2020-08-01 RX ORDER — INSULIN LISPRO 100 [IU]/ML
INJECTION, SOLUTION INTRAVENOUS; SUBCUTANEOUS
Status: DISCONTINUED | OUTPATIENT
Start: 2020-08-01 | End: 2020-08-03 | Stop reason: HOSPADM

## 2020-08-01 RX ORDER — ASPIRIN 81 MG/1
81 TABLET ORAL DAILY
Status: DISCONTINUED | OUTPATIENT
Start: 2020-08-01 | End: 2020-08-03 | Stop reason: HOSPADM

## 2020-08-01 RX ORDER — CLOPIDOGREL BISULFATE 75 MG/1
75 TABLET ORAL DAILY
Status: DISCONTINUED | OUTPATIENT
Start: 2020-08-01 | End: 2020-08-03 | Stop reason: HOSPADM

## 2020-08-01 RX ORDER — SODIUM CHLORIDE 900 MG/100ML
INJECTION INTRAVENOUS
Status: COMPLETED
Start: 2020-08-01 | End: 2020-08-01

## 2020-08-01 RX ORDER — CARVEDILOL 25 MG/1
25 TABLET ORAL 2 TIMES DAILY WITH MEALS
Status: DISCONTINUED | OUTPATIENT
Start: 2020-08-01 | End: 2020-08-03 | Stop reason: HOSPADM

## 2020-08-01 RX ORDER — POLYETHYLENE GLYCOL 3350 17 G/17G
17 POWDER, FOR SOLUTION ORAL DAILY PRN
Status: DISCONTINUED | OUTPATIENT
Start: 2020-08-01 | End: 2020-08-03 | Stop reason: HOSPADM

## 2020-08-01 RX ORDER — MAGNESIUM SULFATE 100 %
4 CRYSTALS MISCELLANEOUS AS NEEDED
Status: DISCONTINUED | OUTPATIENT
Start: 2020-08-01 | End: 2020-08-03 | Stop reason: HOSPADM

## 2020-08-01 RX ORDER — HEPARIN SODIUM 5000 [USP'U]/ML
5000 INJECTION, SOLUTION INTRAVENOUS; SUBCUTANEOUS EVERY 8 HOURS
Status: DISCONTINUED | OUTPATIENT
Start: 2020-08-01 | End: 2020-08-03 | Stop reason: HOSPADM

## 2020-08-01 RX ORDER — INSULIN GLARGINE 100 [IU]/ML
30 INJECTION, SOLUTION SUBCUTANEOUS 2 TIMES DAILY
Status: DISCONTINUED | OUTPATIENT
Start: 2020-08-01 | End: 2020-08-03

## 2020-08-01 RX ORDER — PREGABALIN 50 MG/1
100 CAPSULE ORAL 3 TIMES DAILY
Status: DISCONTINUED | OUTPATIENT
Start: 2020-08-01 | End: 2020-08-03 | Stop reason: HOSPADM

## 2020-08-01 RX ORDER — CHLORTHALIDONE 25 MG/1
50 TABLET ORAL DAILY
Status: DISCONTINUED | OUTPATIENT
Start: 2020-08-01 | End: 2020-08-03 | Stop reason: HOSPADM

## 2020-08-01 RX ORDER — VANCOMYCIN HYDROCHLORIDE 750 MG/15ML
INJECTION, POWDER, LYOPHILIZED, FOR SOLUTION INTRAVENOUS
Status: COMPLETED
Start: 2020-08-01 | End: 2020-08-01

## 2020-08-01 RX ORDER — AMLODIPINE BESYLATE 10 MG/1
10 TABLET ORAL DAILY
Status: DISCONTINUED | OUTPATIENT
Start: 2020-08-01 | End: 2020-08-03 | Stop reason: HOSPADM

## 2020-08-01 RX ORDER — PROMETHAZINE HYDROCHLORIDE 25 MG/1
12.5 TABLET ORAL
Status: DISCONTINUED | OUTPATIENT
Start: 2020-08-01 | End: 2020-08-03 | Stop reason: HOSPADM

## 2020-08-01 RX ORDER — NALOXONE HYDROCHLORIDE 0.4 MG/ML
0.4 INJECTION, SOLUTION INTRAMUSCULAR; INTRAVENOUS; SUBCUTANEOUS AS NEEDED
Status: DISCONTINUED | OUTPATIENT
Start: 2020-08-01 | End: 2020-08-03 | Stop reason: HOSPADM

## 2020-08-01 RX ORDER — SODIUM CHLORIDE 0.9 % (FLUSH) 0.9 %
5-40 SYRINGE (ML) INJECTION EVERY 8 HOURS
Status: DISCONTINUED | OUTPATIENT
Start: 2020-08-01 | End: 2020-08-03 | Stop reason: SDUPTHER

## 2020-08-01 RX ORDER — SODIUM CHLORIDE 0.9 % (FLUSH) 0.9 %
5-40 SYRINGE (ML) INJECTION AS NEEDED
Status: DISCONTINUED | OUTPATIENT
Start: 2020-08-01 | End: 2020-08-03 | Stop reason: SDUPTHER

## 2020-08-01 RX ORDER — GABAPENTIN 100 MG/1
100 CAPSULE ORAL 3 TIMES DAILY
COMMUNITY

## 2020-08-01 RX ORDER — ACETAMINOPHEN 650 MG/1
650 SUPPOSITORY RECTAL
Status: DISCONTINUED | OUTPATIENT
Start: 2020-08-01 | End: 2020-08-03 | Stop reason: HOSPADM

## 2020-08-01 RX ORDER — HYDROMORPHONE HYDROCHLORIDE 1 MG/ML
0.5 INJECTION, SOLUTION INTRAMUSCULAR; INTRAVENOUS; SUBCUTANEOUS
Status: DISCONTINUED | OUTPATIENT
Start: 2020-08-01 | End: 2020-08-03

## 2020-08-01 RX ORDER — ONDANSETRON 2 MG/ML
4 INJECTION INTRAMUSCULAR; INTRAVENOUS
Status: DISCONTINUED | OUTPATIENT
Start: 2020-08-01 | End: 2020-08-03 | Stop reason: HOSPADM

## 2020-08-01 RX ADMIN — PREGABALIN 100 MG: 25 CAPSULE ORAL at 10:11

## 2020-08-01 RX ADMIN — HYDROMORPHONE HYDROCHLORIDE 0.5 MG: 1 INJECTION, SOLUTION INTRAMUSCULAR; INTRAVENOUS; SUBCUTANEOUS at 21:23

## 2020-08-01 RX ADMIN — SODIUM CHLORIDE 250 ML: 900 INJECTION, SOLUTION INTRAVENOUS at 03:20

## 2020-08-01 RX ADMIN — Medication 10 ML: at 21:23

## 2020-08-01 RX ADMIN — Medication 10 ML: at 03:24

## 2020-08-01 RX ADMIN — CARVEDILOL 25 MG: 25 TABLET, FILM COATED ORAL at 16:17

## 2020-08-01 RX ADMIN — ASPIRIN 81 MG: 81 TABLET, COATED ORAL at 09:00

## 2020-08-01 RX ADMIN — SODIUM CHLORIDE: 900 INJECTION, SOLUTION INTRAVENOUS at 00:00

## 2020-08-01 RX ADMIN — HYDROMORPHONE HYDROCHLORIDE 0.5 MG: 1 INJECTION, SOLUTION INTRAMUSCULAR; INTRAVENOUS; SUBCUTANEOUS at 04:06

## 2020-08-01 RX ADMIN — Medication 10 ML: at 16:08

## 2020-08-01 RX ADMIN — PREGABALIN 100 MG: 25 CAPSULE ORAL at 22:36

## 2020-08-01 RX ADMIN — HYDROMORPHONE HYDROCHLORIDE 0.5 MG: 1 INJECTION, SOLUTION INTRAMUSCULAR; INTRAVENOUS; SUBCUTANEOUS at 16:01

## 2020-08-01 RX ADMIN — Medication 10 ML: at 03:22

## 2020-08-01 RX ADMIN — HEPARIN SODIUM 5000 UNITS: 5000 INJECTION INTRAVENOUS; SUBCUTANEOUS at 03:23

## 2020-08-01 RX ADMIN — ATORVASTATIN CALCIUM 80 MG: 80 TABLET, FILM COATED ORAL at 09:59

## 2020-08-01 RX ADMIN — INSULIN GLARGINE 30 UNITS: 100 INJECTION, SOLUTION SUBCUTANEOUS at 17:16

## 2020-08-01 RX ADMIN — PREGABALIN 100 MG: 25 CAPSULE ORAL at 16:17

## 2020-08-01 RX ADMIN — HYDROMORPHONE HYDROCHLORIDE 0.5 MG: 1 INJECTION, SOLUTION INTRAMUSCULAR; INTRAVENOUS; SUBCUTANEOUS at 10:12

## 2020-08-01 RX ADMIN — VANCOMYCIN HYDROCHLORIDE 750 MG: 750 INJECTION, POWDER, LYOPHILIZED, FOR SOLUTION INTRAVENOUS at 03:19

## 2020-08-01 RX ADMIN — HEPARIN SODIUM 5000 UNITS: 5000 INJECTION INTRAVENOUS; SUBCUTANEOUS at 17:17

## 2020-08-01 RX ADMIN — CLOPIDOGREL BISULFATE 75 MG: 75 TABLET ORAL at 09:59

## 2020-08-01 RX ADMIN — PIPERACILLIN AND TAZOBACTAM 3.38 G: 3; .375 INJECTION, POWDER, LYOPHILIZED, FOR SOLUTION INTRAVENOUS at 16:08

## 2020-08-01 RX ADMIN — INSULIN GLARGINE 30 UNITS: 100 INJECTION, SOLUTION SUBCUTANEOUS at 10:01

## 2020-08-01 NOTE — ED NOTES
TRANSFER - OUT REPORT:    Verbal report given to Rn(name) on Dulce Thomas  being transferred to (unit) for routine progression of care       Report consisted of patients Situation, Background, Assessment and   Recommendations(SBAR). Information from the following report(s) SBAR was reviewed with the receiving nurse. Lines:   Peripheral IV 07/31/20 Left Antecubital (Active)   Site Assessment Clean, dry, & intact 07/31/20 1950   Phlebitis Assessment 0 07/31/20 1950   Infiltration Assessment 0 07/31/20 1950   Dressing Status Clean, dry, & intact 07/31/20 1950   Dressing Type Transparent 07/31/20 1950   Hub Color/Line Status Green 07/31/20 1950        Opportunity for questions and clarification was provided.       Patient transported with:   Monitor  Patient's medications from home  Registered Nurse

## 2020-08-01 NOTE — PROGRESS NOTES
Discharge/Transition Planning  Problem: Discharge Planning  Goal: *Discharge to safe environment  Outcome: Progressing Towards Goal   Home and likely [place]    Reason for Admission:   Osteomyelitis of great toe of right foot                    RUR Score:          18%           Plan for utilizing home health:      tbd    PCP: First and Last name:  Had seen Dr Bebo Lo and unclear when last appt was. More than 1 year   Name of Practice:    Are you a current patient: Yes/No:    Approximate date of last visit:    Can you participate in a virtual visit with your PCP:                     Current Advanced Directive/Advance Care Plan: Explained Advance Directive and pt refused to do at this time stating God would take him and he would leave it up to God. Explained again lack of capacity and Advanced Directive and he did not want to discuss                         Transition of Care Plan:                    Interviewed pt with difficulty as difficult to get information from. Pt stated has not been to PCP in awhile . Was seeing Dr David Tuttle for Podiatry but states she would not take toenail off as he told her to and would just give him meds and tell him to come back so he believes she is in it for money. States Dialysis is [facility] T[facility] [address] but doesn't always go cause doesn't believe he is really in kidney failure and they are just doing it to him for money. Uses Medicaid cab or van to appointments. Has [facility] Medicaid for insurance. Lives with a cousin Esha Jorgensen in apartment and wouldn't give a number. Did not sound like he uses any medical equipment but difficult to keep on track for info. Plan is Home and [place] if needed. Patient has designated no one________________________ to participate in his/her discharge plan and to receive any needed information.      Name:   Address:  Phone number:      Care Management Interventions  PCP Verified by CM: No(hasnt been to PCP in a year or more)  Mode of Transport at Discharge: (medicaid cab)  Transition of Care Consult (CM Consult): Discharge Planning  Current Support Network:  Other(with cousin)  Confirm Follow Up Transport: Other (see comment)(cab)  The Plan for Transition of Care is Related to the Following Treatment Goals : resolution of acute needs  Discharge Location  Discharge Placement: Home with home health(HH if recommended)

## 2020-08-01 NOTE — PROGRESS NOTES
Internal Medicine Progress Note    Patient's Name: Chandu Langley  Admit Date: 7/31/2020  Length of Stay: 1      Assessment/Plan     Active Hospital Problems    Diagnosis Date Noted    ESRD (end stage renal disease) ([facility] 07/31/2020    Osteomyelitis of great toe of right foot ([facility] 07/31/2020    HTN (hypertension) 07/31/2020    Uncontrolled type 1 diabetes mellitus ([facility] 11/03/2014    Hyperlipidemia 11/03/2014     - Afebrile, no leukocytosis  - Cont IVAB  - Plan for amputation adolph per podiatry  - Expect clean margins  - Nephro managing dialysis  - Cont acceptable home medications for chronic conditions   - DVT protocol    I have personally reviewed all pertinent labs and films that have officially resulted over the last 24 hours. I have personally checked for all pending labs that are awaiting final results.     Subjective     Pt s/e @ bedside  No major events overnight  Pain tolerable  Denies CP or SOB    Objective     Visit Vitals  /81   Pulse 77   Temp 97 °F (36.1 °C) (Axillary)   Resp 18   Ht 5' 11\" (1.803 m)   Wt 89.4 kg (197 lb)   SpO2 98%   BMI 27.48 kg/m²       Physical Exam:  General Appearance: NAD, conversant  Lungs: CTA with normal respiratory effort  CV: RRR, no m/r/g  Abdomen: soft, non-tender, normal bowel sounds  Extremities: no cyanosis, no peripheral edema, RLE w/ large big toe w/ edema/erythema and pus  Neuro: No focal deficits, motor/sensory intact    Lab/Data Reviewed:  BMP:   Lab Results   Component Value Date/Time     (L) 08/01/2020 03:20 AM    K 4.0 08/01/2020 03:20 AM     08/01/2020 03:20 AM    CO2 23 08/01/2020 03:20 AM    AGAP 10 08/01/2020 03:20 AM    GLU 87 08/01/2020 03:20 AM    BUN 49 (H) 08/01/2020 03:20 AM    CREA 7.64 (H) 08/01/2020 03:20 AM    GFRAA 9 (L) 08/01/2020 03:20 AM    GFRNA 7 (L) 08/01/2020 03:20 AM     CBC:   Lab Results   Component Value Date/Time    WBC 7.5 08/01/2020 03:20 AM    HGB 10.5 (L) 08/01/2020 03:20 AM    HCT 32.3 (L) 08/01/2020 03:20 AM     08/01/2020 03:20 AM       Imaging Reviewed:  Xr Foot Rt Min 3 V    Result Date: 8/1/2020  EXAM: RIGHT FOOT RADIOGRAPHS CLINICAL INDICATION/HISTORY: Pain, infected toe -Additional: None COMPARISON: None TECHNIQUE: 3 views of the right foot _______________ FINDINGS: Soft tissue ulceration is present involving the great toe distally with accompanying areas of osseous erosion and truncation involving the distal phalanx of the great toe. No evidence of superimposed acute fracture. No retained radiopaque foreign object. Degenerative changes noted throughout the midfoot as well as across the first MTP joint. IMPRESSION: Soft tissue ulcer and radiographic stigmata of osteomyelitis involving the great toe of the right foot.       Medications Reviewed:  Current Facility-Administered Medications   Medication Dose Route Frequency    piperacillin-tazobactam (ZOSYN) 2.25 g in 0.9% sodium chloride (MBP/ADV) 50 mL MBP  2.25 g IntraVENous Q12H    amLODIPine (NORVASC) tablet 10 mg  10 mg Oral DAILY    aspirin delayed-release tablet 81 mg  81 mg Oral DAILY    atorvastatin (LIPITOR) tablet 80 mg  80 mg Oral DAILY    carvediloL (COREG) tablet 25 mg  25 mg Oral BID WITH MEALS    chlorthalidone (HYGROTEN) tablet 50 mg  50 mg Oral DAILY    clopidogreL (PLAVIX) tablet 75 mg  75 mg Oral DAILY    insulin glargine (LANTUS) injection 30 Units  30 Units SubCUTAneous BID    pregabalin (LYRICA) capsule 100 mg  100 mg Oral TID    sodium chloride (NS) flush 5-40 mL  5-40 mL IntraVENous Q8H    sodium chloride (NS) flush 5-40 mL  5-40 mL IntraVENous PRN    acetaminophen (TYLENOL) tablet 650 mg  650 mg Oral Q6H PRN    Or    acetaminophen (TYLENOL) suppository 650 mg  650 mg Rectal Q6H PRN    polyethylene glycol (MIRALAX) packet 17 g  17 g Oral DAILY PRN    promethazine (PHENERGAN) tablet 12.5 mg  12.5 mg Oral Q6H PRN    Or    ondansetron (ZOFRAN) injection 4 mg  4 mg IntraVENous Q6H PRN    heparin (porcine) injection 5,000 Units  5,000 Units SubCUTAneous Q8H    insulin lispro (HUMALOG) injection   SubCUTAneous AC&HS    glucose chewable tablet 16 g  4 Tab Oral PRN    glucagon (GLUCAGEN) injection 1 mg  1 mg IntraMUSCular PRN    piperacillin-tazobactam (ZOSYN) 0.75 g in 0.9% sodium chloride 50 mL IVPB  0.75 g IntraVENous Q TUE, THU & SAT    VANCOMYCIN INFORMATION NOTE   Other Rx Dosing/Monitoring    HYDROmorphone (DILAUDID) syringe 0.5 mg  0.5 mg IntraVENous Q4H PRN    naloxone (NARCAN) injection 0.4 mg  0.4 mg IntraVENous PRN    vancomycin (VANCOCIN) 750 mg, 0.9% sodium chloride (MBP/ADV) 250 mL   IntraVENous ONCE           Enzo Troy DO  Internal Medicine, Hospitalist  Pager: [phone]  [address] [place] [address]

## 2020-08-01 NOTE — PROGRESS NOTES
Bedside shift change report given to this RN (oncoming nurse) by TARAH Hogan (offgoing nurse). Report included the following information SBAR and Kardex.

## 2020-08-01 NOTE — DIALYSIS
SHARON        ACUTE HEMODIALYSIS FLOW SHEET      HEMODIALYSIS ORDERS: Physician: Kylah     Dialyzer: revaclear   Duration: 3 hr  BFR: 350   DFR: 800   Dialysate:  Temp 36-37*C  K+   2    Ca+  2.5 Na 140 Bicarb 35   Weight:   Wt Readings from Last 1 Encounters:   08/01/20 89.4 kg (197 lb)     UF Goal: 2500 Access TDC Needle Gauge     Heparin []  Bolus      Units    [] Hourly       Units    []None      Catheter locking solution Heparin   Pre BP:   139/92    Pulse:     72       Respirations: 18  Temperature:   97.5   Labs: Pre        Post:        [] N/A   Additional Orders(medications, blood products, hypotension management):       [] N/A     [x] [facility] Consent Verified     CATHETER ACCESS: []N/A   [x]Right   []Left   [x]IJ     []Fem   []transhepatic   [] First use X-ray verified     []Permanent                [] Temporary   [x]No S/S infection  []Redness  []Drainage []Cultured []Swelling []Pain   [x]Medical Aseptic Prep Utilized   []Dressing Changed  [] Biopatch  Date:       []Clotted   []Patent   Flows: []Good  []Poor  []Reversed   If access problem,  notified: []Yes    []N/A  Date:           GRAFT/FISTULA ACCESS:  []N/A     []Right     []Left     []UE     []LE   []AVG   []AVF        []Buttonhole    []Medical Aseptic Prep Utilized   []No S/S infection  []Redness  []Drainage []Cultured []Swelling []Pain    Bruit:   [] Strong    [] Weak       Thrill :   [] Strong    [] Weak       Needle Gauge:    Length:     If access problem,  notified: []Yes     []N/A  Date:        Please describe access if present and not used:                            GENERAL ASSESSMENT:      LUNGS:  Rate  SaO2%        [] N/A    [x] Clear  [] Coarse  [] Crackles  [] Wheezing        [] Diminished     Location : []RLL   []LLL    []RUL  []HANSA     Cough: []Productive  []Dry  [x]N/A   Respirations:  [x]Easy  []Labored     Therapy:   [x]RA  []NC  l/min    Mask: []NRB []Venti       O2%                  []Ventilator  []Intubated  [] Trach  [] BiPaP CARDIAC: [x]Regular      [] Irregular   [] Pericardial Rub  [] JVD        []  Monitored  [] Bedside  [x] Remotely monitored [] N/A  Rhythm:      EDEMA: [] None  [x]Generalized  [] Pitting [] 1    [] 2    [] 3    [] 4                 [] Facial  [] Pedal  []  UE  [] LE     SKIN:   [x] Warm  [] Hot     [] Cold   [x] Dry     [] Pale   [] Diaphoretic                  [] Flushed  [] Jaundiced  [] Cyanotic  [] Rash  [] Weeping     LOC:    [x] Alert      [x]Oriented:    [x] Person     [x] Place  [x]Time               [] Confused  [] Lethargic  [] Medicated  [] Non-responsive     GI / ABDOMEN:  [] Flat    [] Distended    [x] Soft    [] Firm   []  Obese                             [] Diarrhea  [x] Bowel Sounds  [] Nausea  [] Vomiting       / URINE ASSESSMENT:[] Voiding   [x] Oliguria  [] Anuria   []  Xiao     [] Incontinent    []  Incontinent Brief      []  Bathroom Privileges       PAIN: [x] 0 []1  []2   []3   []4   []5   []6   []7   []8   []9   []10              Scale 0-10  Action/Follow Up:      MOBILITY:  [] Amb    [] Amb/Assist    [x] Bed    [] Wheelchair  [] Stretcher      All Vitals and Treatment Details on Attached [address]: [facility]          Room # 2217/01      [] 1st Time Acute  [] Stat  [] Routine  [] Urgent     [] Acute Room  []  Bedside  [] ICU/CCU  [] ER   Isolation Precautions:   There are currently no Active Isolations      Special Considerations:         [] Blood Consent Verified []N/A     ALLERGIES: No Known Allergies            Code Status:Full Code        Hepatitis Status:    2nd RN check:                     Lab Results   Component Value Date/Time    Hepatitis B surface Ag 0.36 11/23/2018 05:00 PM    Hepatitis B surface Ab <3.10 (L) 11/23/2018 05:00 PM    Hepatitis B core, IgM NEGATIVE  11/28/2018 01:00 PM    Hep B Core Ab, IgM NEGATIVE  11/28/2018 01:00 PM    Hep B Core Ab, total NEGATIVE  11/28/2018 01:00 PM                     Current Labs:   Lab Results   Component Value Date/Time Sodium 134 (L) 08/01/2020 03:20 AM    Potassium 4.0 08/01/2020 03:20 AM    Chloride 101 08/01/2020 03:20 AM    CO2 23 08/01/2020 03:20 AM    Anion gap 10 08/01/2020 03:20 AM    Glucose 87 08/01/2020 03:20 AM    BUN 49 (H) 08/01/2020 03:20 AM    Creatinine 7.64 (H) 08/01/2020 03:20 AM    BUN/Creatinine ratio 6 (L) 08/01/2020 03:20 AM    GFR est AA 9 (L) 08/01/2020 03:20 AM    GFR est non-AA 7 (L) 08/01/2020 03:20 AM    Calcium 8.3 (L) 08/01/2020 03:20 AM      Lab Results   Component Value Date/Time    WBC 7.5 08/01/2020 03:20 AM    Hemoglobin, POC 9.9 (L) 11/23/2018 01:03 PM    HGB 10.5 (L) 08/01/2020 03:20 AM    Hematocrit, POC 29 (L) 11/23/2018 01:03 PM    HCT 32.3 (L) 08/01/2020 03:20 AM    PLATELET 720 66/44/3191 03:20 AM    MCV 84.6 08/01/2020 03:20 AM                                                                                     DIET: DIET DIABETIC CONSISTENT CARB       PRIMARY NURSE REPORT: First initial/Last name/Title      Pre Dialysis: SCARLET Da Silva RN     Time: 1020      EDUCATION:    [] Patient [] Other         Knowledge Basis: []None [x]Minimal [] Substantial   Barriers to learning  []N/A   [] Access Care     [] S&S of infection     [] Fluid Management     []K+     [x]Procedural    []Albumin     [] Medications     [] Tx Options     [] Transplant     [] Diet     [] Other   Teaching Tools:  [x] Explain  [] Demo  [] Handouts [] Video  Patient response:   [] Verbalized understanding  [x] Teach back  [] Return demonstration [] Requires follow up   Inappropriate due to            [x]1100 Time Out/Safety Check  [x]Extracorporeal Circuit Tested for integrity       RO/HEMODIALYSIS MACHINE SAFETY CHECKS  Before each treatment:     Machine Number:                   [facility]                                   [] Unit Machine #  with centralized RO                                  [] Portable Machine #1/RO serial # [id number]                                  [] Portable Machine #2/RO serial # [id number]                                  [] Portable Machine #4/RO serial # [id number]                                                     [address]                                  [] Portable Machine #11/RO serial # [id number]                                   [] Portable Machine #12/RO serial # [id number]                                  [] Portable Machine #13/RO serial #  [id number]      Alarm Test:  Pass time 1100               [x] RO/Machine Log Complete      Temp    36             Dialysate: pH  7.4 Conductivity: Meter   14     HD Machine   14                  TCD: 38  Dialyzer Lot # [id number]            Blood Tubing Lot # [id number]          Saline Lot #  [id number]     CHLORINE TESTING-Before each treatment and every 4 hours    Total Chlorine: [] less than 0.1 ppm  Time: 1100 4 Hr/2nd Check Time:    (if greater than 0.1 ppm from Primary then every 30 minutes from Secondary)     TREATMENT INITIATION  with Dialysis Precautions:   [x] All Connections Secured                 [x] Saline Line Double Clamped   [x] Venous Parameters Set                  [x] Arterial Parameters Set    [x] Prime Given 250ml                          [x]Air Foam Detector Engaged      Treatment Initiation Note: Patient arrived the unit in stable condition. A/O x3 verbally responsive. VSS. Right chest TDC accessed, arteriole flushes and aspirates sluggish, venous flushes with no aspiration. HD started without difficulty. During Treatment Notes: 1200 Patient tolerating treatment, face and access visible and intact. 65- Dr. Luis A Gonzalez and a Podiatrist in to see patient, Pt. Tolerating treatment. 1300-  Patient tolerating treatment eyes closed, face and access visible and intact.              Medication Dose Volume Route Time [facility] name Title         RN         RN         RN           RN                   Post Assessment:   Dialyzer Cleared: [x] Good [] Fair  [] Poor  Blood processed:  60 L  UF Removed  2000 Ml  POst BP:   131/74 Pulse: 79        Respirations: 18  Temperature: 97.8 Lungs:     [] Clear      [x] Course         [] Crackles    [] Wheezing         [] Diminished   Post Tx Vascular Access:   AVF/AVG: Bleeding stopped   Art  min. Varghese. Min   N/A Cardiac:   [x] Regular   [] Irregular   [] Monitor  [] N/A      Rhythm:       Catheter:   Locking solution: Heparin 1:1000   Art. 2.2  Varghese. 2.3  N/A    Skin:   Pain:    [x] Warm  [x] Dry [] Diaphoretic    [] Flushed    [] Pale [] Cyanotic []0  []1  []2   []3  []4   []5   []6   []7   []8   []9   []10     Post Treatment Note:   Patient completed and tolerated 3hrs of HD, 2000 ml of fluid romoved. Patient returned to his room in stable condition.        POST TREATMENT PRIMARY NURSE HANDOFF REPORT:     First initial/Last name/Title         Post Dialysis: SCARLET Da Silva RN Time:  1981     Abbreviations: AVG-arterial venous graft, AVF-arterial venous fistula, IJ-Internal Jugular, Subcl-Subclavian, Fem-Femoral, Tx-treatment, AP/HR-apical heart rate, DFR-dialysate flow rate, BFR-blood flow rate, AP-arterial pressure, -venous pressure, UF-ultrafiltrate, TMP-transmembrane pressure, V[place]hese-Venous, Art-Arterial, RO-Reverse Osmosis

## 2020-08-01 NOTE — CONSULTS
Podiatry Consultation Note    Assessment:     Irina Garcia is a 48 y.o. male who present with right foot cellulitis and osteomyelitis of hallux        Plan:     Patient was examined bedside. Reviewed X-rays of right foot with erosions of phalanges of hallux consistent with osteomyelitis  Cultures to be obtained intra op  Antibiotics: vanc/zosyn  Dressings: betadine DSD  NWB to right foot following surgery  Surgical intervention indicated at this time for amputation of right hallux. Anticipate amputation to be clean margin, and patient should be stable for DC from podiatry standpoint 24-48hours after procedure with PO abx. Plan for surgery tomorrow morning/afternoon 8/2/20, for toe amputation. NPO after midnight      Subjective:     I was asked by to evaluate Irina Garcia for foot infection. He  is a 48 y.o. male admitted on 7/31/2020 for Osteomyelitis of great toe of right foot ([facility] [address] [M86.9]. PT presents to the ED for evaluation of foot infection. The symptoms started roughly a few weeks ago. He says the toe has been getting more swollen and he has noticed pus coming out. Pt describes pain to the area. Symptoms are worst with pressure. No other podiatric concerns at this time. Patient denies any N/V/F/C/SOB.     No Known Allergies    Current Facility-Administered Medications   Medication Dose Route Frequency    piperacillin-tazobactam (ZOSYN) 2.25 g in 0.9% sodium chloride (MBP/ADV) 50 mL MBP  2.25 g IntraVENous Q12H    amLODIPine (NORVASC) tablet 10 mg  10 mg Oral DAILY    aspirin delayed-release tablet 81 mg  81 mg Oral DAILY    atorvastatin (LIPITOR) tablet 80 mg  80 mg Oral DAILY    carvediloL (COREG) tablet 25 mg  25 mg Oral BID WITH MEALS    chlorthalidone (HYGROTEN) tablet 50 mg  50 mg Oral DAILY    clopidogreL (PLAVIX) tablet 75 mg  75 mg Oral DAILY    insulin glargine (LANTUS) injection 30 Units  30 Units SubCUTAneous BID    pregabalin (LYRICA) capsule 100 mg  100 mg Oral TID    sodium chloride (NS) flush 5-40 mL  5-40 mL IntraVENous Q8H    sodium chloride (NS) flush 5-40 mL  5-40 mL IntraVENous PRN    acetaminophen (TYLENOL) tablet 650 mg  650 mg Oral Q6H PRN    Or    acetaminophen (TYLENOL) suppository 650 mg  650 mg Rectal Q6H PRN    polyethylene glycol (MIRALAX) packet 17 g  17 g Oral DAILY PRN    promethazine (PHENERGAN) tablet 12.5 mg  12.5 mg Oral Q6H PRN    Or    ondansetron (ZOFRAN) injection 4 mg  4 mg IntraVENous Q6H PRN    heparin (porcine) injection 5,000 Units  5,000 Units SubCUTAneous Q8H    insulin lispro (HUMALOG) injection   SubCUTAneous AC&HS    glucose chewable tablet 16 g  4 Tab Oral PRN    glucagon (GLUCAGEN) injection 1 mg  1 mg IntraMUSCular PRN    piperacillin-tazobactam (ZOSYN) 0.75 g in 0.9% sodium chloride 50 mL IVPB  0.75 g IntraVENous Q TUE, THU & SAT    VANCOMYCIN INFORMATION NOTE   Other Rx Dosing/Monitoring    HYDROmorphone (DILAUDID) syringe 0.5 mg  0.5 mg IntraVENous Q4H PRN    naloxone (NARCAN) injection 0.4 mg  0.4 mg IntraVENous PRN    vancomycin (VANCOCIN) 750 mg, 0.9% sodium chloride (MBP/ADV) 250 mL   IntraVENous ONCE       Past Medical History:   Diagnosis Date    CAD (coronary artery disease)     MI x3, 15 stents.  2008    Chronic kidney disease     Diabetes ([facility] [address]     Dx in late 25s    GERD (gastroesophageal reflux disease)     Hypertension      Past Surgical History:   Procedure Laterality Date    CARDIAC SURG PROCEDURE UNLIST  2008    Stents (15)    HX ORTHOPAEDIC      left shoulder manipulation and bone spur removal     Family History   Problem Relation Age of Onset    Diabetes Mother     Diabetes Father      Social History     Socioeconomic History    Marital status: SINGLE     Spouse name: Not on file    Number of children: Not on file    Years of education: Not on file    Highest education level: Not on file   Occupational History    Not on file   Social Needs    Financial resource strain: Not on file   [place] insecurity     Worry: Not on file     Inability: Not on file    Transportation needs     Medical: Not on file     Non-medical: Not on file   Tobacco Use    Smoking status: Current Some Day Smoker     Packs/day: 0.25     Years: 13.00     Pack years: 3.25    Smokeless tobacco: Never Used   Substance and Sexual Activity    Alcohol use: No    Drug use: No    Sexual activity: Yes     Partners: Female   Lifestyle    Physical activity     Days per week: Not on file     Minutes per session: Not on file    Stress: Not on file   Relationships    Social connections     Talks on phone: Not on file     Gets together: Not on file     Attends Anabaptism service: Not on file     Active member of club or organization: Not on file     Attends meetings of clubs or organizations: Not on file     Relationship status: Not on file    Intimate partner violence     Fear of current or ex partner: Not on file     Emotionally abused: Not on file     Physically abused: Not on file     Forced sexual activity: Not on file   Other Topics Concern    Not on file   Social History Narrative    Not on file       Physical Exam:  General appearance: alert, cooperative, no distress, appears stated age  Head: Normocephalic, without obvious abnormality, atraumatic  Neck: supple, trachea midline  Lungs: clear to auscultation bilaterally  Heart: regular rate and rhythm, S1, S2 normal, no murmur, click, rub or gallop  Abdomen: soft, non-tender. Bowel sounds normal. No masses,  no organomegaly  Extremities: R large toe appears swollen , mild desquamation with area on lateral aspect of small ulceration and location of previous pus oozing.     Skin: Skin color, texture, turgor normal. No rashes or lesions  Neurologic: Grossly normal        Vitals:    08/01/20 0800 08/01/20 0845 08/01/20 1106 08/01/20 1200   BP:  121/75 (!) 154/97 132/81   Pulse:  73 74 77   Resp:  18 18 18   Temp:  97.8 °F (36.6 °C) 97 °F (36.1 °C)    TempSrc:   Axillary    SpO2: 98% 98%     Weight:       Height:           OBJECTIVE:    Patient is healthy and alert. Lower Extremity Exam:     Vascular: Dorsalis Pedis 2/4 and Posterior Tibial 1/4 Bilaterally. Pedal hair is   Decreased. There  are not varicosities present. Pedal edema is mild. Neurological:  Light touch protective sensation is diminished to both feet. There are not non reproducible paresthesias to bilateral lower extremities. There are no Tinel's or 's signs present to the nerves crossing the ankle joint. Orthopedic:  No gross abnormalities present. Manual muscle testing is 5/5 right and 5/5 left for all groups traversing the ankles. Dermatological:   There is medial wound with purulent drainage to distal hallux nail fold. Toe is edematous with mild erythema present. Onychomycosis to nail.       Recent Results (from the past 24 hour(s))   EKG, 12 LEAD, INITIAL    Collection Time: 07/31/20  6:49 PM   Result Value Ref Range    Ventricular Rate 76 BPM    Atrial Rate 76 BPM    P-R Interval 172 ms    QRS Duration 108 ms    Q-T Interval 460 ms    QTC Calculation (Bezet) 517 ms    Calculated P Axis 19 degrees    Calculated R Axis 4 degrees    Calculated T Axis 45 degrees    Diagnosis       Normal sinus rhythm  Possible Anterior infarct , age undetermined  Prolonged QT  Abnormal ECG  When compared with ECG of 06-JUL-2019 19:39,  premature ventricular complexes are no longer present  Borderline criteria for Anterior infarct are now present     CBC WITH AUTOMATED DIFF    Collection Time: 07/31/20  7:12 PM   Result Value Ref Range    WBC 8.3 4.6 - 13.2 K/uL    RBC 3.66 (L) 4.70 - 5.50 M/uL    HGB 10.2 (L) 13.0 - 16.0 g/dL    HCT 30.5 (L) 36.0 - 48.0 %    MCV 83.3 74.0 - 97.0 FL    MCH 27.9 24.0 - 34.0 PG    MCHC 33.4 31.0 - 37.0 g/dL    RDW 14.8 (H) 11.6 - 14.5 %    PLATELET 143 388 - 380 K/uL    MPV 10.3 9.2 - 11.8 FL    NEUTROPHILS 76 (H) 40 - 73 %    LYMPHOCYTES 13 (L) 21 - 52 %    MONOCYTES 7 3 - 10 %    EOSINOPHILS 3 0 - 5 %    BASOPHILS 1 0 - 2 %    ABS. NEUTROPHILS 6.3 1.8 - 8.0 K/UL    ABS. LYMPHOCYTES 1.1 0.9 - 3.6 K/UL    ABS. MONOCYTES 0.6 0.05 - 1.2 K/UL    ABS. EOSINOPHILS 0.3 0.0 - 0.4 K/UL    ABS. BASOPHILS 0.0 0.0 - 0.1 K/UL    DF AUTOMATED     CARDIAC PANEL,(CK, CKMB & TROPONIN)    Collection Time: 07/31/20  7:12 PM   Result Value Ref Range    CK - MB 7.8 (H) <3.6 ng/ml    CK-MB Index 1.5 0.0 - 4.0 %     (H) 39 - 308 U/L    Troponin-I, QT 0.04 0.0 - 5.345 NG/ML   METABOLIC PANEL, COMPREHENSIVE    Collection Time: 07/31/20  7:12 PM   Result Value Ref Range    Sodium 131 (L) 136 - 145 mmol/L    Potassium 4.4 3.5 - 5.5 mmol/L    Chloride 101 100 - 111 mmol/L    CO2 21 21 - 32 mmol/L    Anion gap 9 3.0 - 18 mmol/L    Glucose 90 74 - 99 mg/dL    BUN 48 (H) 7.0 - 18 MG/DL    Creatinine 7.67 (H) 0.6 - 1.3 MG/DL    BUN/Creatinine ratio 6 (L) 12 - 20      GFR est AA 9 (L) >60 ml/min/1.73m2    GFR est non-AA 7 (L) >60 ml/min/1.73m2    Calcium 8.8 8.5 - 10.1 MG/DL    Bilirubin, total 0.7 0.2 - 1.0 MG/DL    ALT (SGPT) 12 (L) 16 - 61 U/L    AST (SGOT) 26 10 - 38 U/L    Alk.  phosphatase 92 45 - 117 U/L    Protein, total 9.5 (H) 6.4 - 8.2 g/dL    Albumin 3.2 (L) 3.4 - 5.0 g/dL    Globulin 6.3 (H) 2.0 - 4.0 g/dL    A-G Ratio 0.5 (L) 0.8 - 1.7     MAGNESIUM    Collection Time: 07/31/20  7:12 PM   Result Value Ref Range    Magnesium 2.4 1.6 - 2.6 mg/dL   SED RATE (ESR)    Collection Time: 07/31/20  7:12 PM   Result Value Ref Range    Sed rate, automated 86 (H) 0 - 20 mm/hr   CULTURE, BLOOD    Collection Time: 07/31/20  7:49 PM    Specimen: Blood   Result Value Ref Range    Special Requests: NO SPECIAL REQUESTS      Culture result: NO GROWTH AFTER 2 HOURS     HEMOGLOBIN A1C WITH EAG    Collection Time: 07/31/20  7:49 PM   Result Value Ref Range    Hemoglobin A1c 6.0 (H) 4.2 - 5.6 %    Est. average glucose 126 mg/dL   CULTURE, BLOOD    Collection Time: 07/31/20 11:40 PM    Specimen: Blood   Result Value Ref Range    Special Requests: NO SPECIAL REQUESTS      Culture result: NO GROWTH AFTER 2 HOURS     METABOLIC PANEL, BASIC    Collection Time: 08/01/20  3:20 AM   Result Value Ref Range    Sodium 134 (L) 136 - 145 mmol/L    Potassium 4.0 3.5 - 5.5 mmol/L    Chloride 101 100 - 111 mmol/L    CO2 23 21 - 32 mmol/L    Anion gap 10 3.0 - 18 mmol/L    Glucose 87 74 - 99 mg/dL    BUN 49 (H) 7.0 - 18 MG/DL    Creatinine 7.64 (H) 0.6 - 1.3 MG/DL    BUN/Creatinine ratio 6 (L) 12 - 20      GFR est AA 9 (L) >60 ml/min/1.73m2    GFR est non-AA 7 (L) >60 ml/min/1.73m2    Calcium 8.3 (L) 8.5 - 10.1 MG/DL   CBC W/O DIFF    Collection Time: 08/01/20  3:20 AM   Result Value Ref Range    WBC 7.5 4.6 - 13.2 K/uL    RBC 3.82 (L) 4.70 - 5.50 M/uL    HGB 10.5 (L) 13.0 - 16.0 g/dL    HCT 32.3 (L) 36.0 - 48.0 %    MCV 84.6 74.0 - 97.0 FL    MCH 27.5 24.0 - 34.0 PG    MCHC 32.5 31.0 - 37.0 g/dL    RDW 15.0 (H) 11.6 - 14.5 %    PLATELET 169 533 - 344 K/uL    MPV 10.3 9.2 - 11.8 FL   GLUCOSE, POC    Collection Time: 08/01/20  7:52 AM   Result Value Ref Range    Glucose (POC) 97 70 - 110 mg/dL         Karie Chaudhary [facility]  [phone]  [phone] VB  8/1/2020, 12:22 PM

## 2020-08-01 NOTE — H&P
Medicine History and Physical    Patient: Jeremiah Flores   Age:  48 y.o. Assessment   Principal Problem:    Osteomyelitis of great toe of right foot ([facility] [address] (7/31/2020)    Active Problems:    Uncontrolled type 1 diabetes mellitus ([facility] [address] (11/3/2014)      Hyperlipidemia (11/3/2014)      ESRD (end stage renal disease) ([facility] [address] (7/31/2020)      HTN (hypertension) (7/31/2020)          Plan     OM R great toe   - xray pending, pus elicited from wound per ED, presumed diagnosis, CRP pending   - vanc zosyn podiatry to be called per Dr. Aidan Andrews    HTN/HLD   - home meds    ESRD   - will need to call to ensure nephrology received consult in am    DM   - SSI, lantus (at lower dose than on home meds), consult dm management    DISPO  -Pt to be admitted  at this time for reasons addressed above, continued hospitalization for ongoing assessment and treatment indicated     Anticipated Date of Discharge: >2 days  Anticipated Disposition (home, SNF) : home    Chief Complaint:   Chief Complaint   Patient presents with    Chest Pain    Toe Pain         HPI:   Jeremiah Flores is a 48y.o. year old male who presents with  Concern for R great toe infection. He states he has DM and hx of CVA with r sided deficit and therefore decreased sensation on that side. He said for last 3 weeks toe has been getting painful swollen and some pus has been eliccited. Patient also has ESRD on Dialysis TTS. In ed given vanc zosyn, requested blood cx. Xray and crp pending. Pus seen by ed doc and per their read there is destruction of distal phalynx . He states last time seeing his podiatrist was 2 months ago. Review of Systems - positive responses in bold type   Constitutional: Negative for fever, chills, diaphoresis and unexpected weight change. HENT: Negative for ear pain, congestion, sore throat, rhinorrhea, drooling, trouble swallowing, neck pain and tinnitus. Eyes: Negative for photophobia, pain, redness and visual disturbance.    Respiratory: negative for shortness of breath, cough, choking, chest tightness, wheezing or stridor. Cardiovascular: Negative for chest pain, palpitations and leg swelling. Gastrointestinal: Negative for nausea, vomiting, abdominal pain, diarrhea, constipation, blood in stool, abdominal distention and anal bleeding. Genitourinary: Negative for dysuria, urgency, frequency, hematuria, flank pain and difficulty urinating. Musculoskeletal: Negative for back pain and arthralgias. Skin: Negative for color change, rash and wound. Neurological: Negative for dizziness, seizures, syncope, speech difficulty, light-headedness or headaches. Hematological: Does not bruise/bleed easily. Psychiatric/Behavioral: Negative for suicidal ideas, hallucinations, behavioral problems, self-injury or agitation       Past Medical History:  Past Medical History:   Diagnosis Date    CAD (coronary artery disease)     MI x3, 15 stents.  2008    Chronic kidney disease     Diabetes ([facility] [address]     Dx in late 25s    GERD (gastroesophageal reflux disease)     Hypertension        Past Surgical History:  Past Surgical History:   Procedure Laterality Date    CARDIAC SURG PROCEDURE UNLIST  2008    Stents (15)    HX ORTHOPAEDIC      left shoulder manipulation and bone spur removal       Family History:  Family History   Problem Relation Age of Onset    Diabetes Mother     Diabetes Father        Social History:  Social History     Socioeconomic History    Marital status: SINGLE     Spouse name: Not on file    Number of children: Not on file    Years of education: Not on file    Highest education level: Not on file   Tobacco Use    Smoking status: Current Some Day Smoker     Packs/day: 0.25     Years: 13.00     Pack years: 3.25    Smokeless tobacco: Never Used   Substance and Sexual Activity    Alcohol use: No    Drug use: No    Sexual activity: Yes     Partners: Female       Home Medications:  Prior to Admission medications    Medication Sig Start Date End Date Taking? Authorizing Provider   pregabalin (Lyrica) 100 mg capsule TAKE 1 CAPSULE BY MOUTH THREE TIMES DAILY 3/21/20   Reyes Delude, MD   clopidogreL (PLAVIX) 75 mg tab Take 1 Tab by mouth daily. 3/21/20   Reyes Delude, MD   atorvastatin (LIPITOR) 80 mg tablet Take 1 Tab by mouth daily for 180 days. 3/21/20 9/17/20  Reyes Delude, MD   insulin lispro (HumaLOG U-100 Insulin) 100 unit/mL injection 40 Units by SubCUTAneous route daily. 3/21/20   Reyes Delude, MD   carvediloL (COREG) 25 mg tablet Take 1 Tab by mouth two (2) times daily (with meals). For blood pressure/heart. Replaces metoprolol 3/21/20   Reyes Delude, MD   fenofibrate (LOFIBRA) 54 mg tablet Take 1 Tab by mouth daily. 3/21/20   Reyes Delude, MD   aspirin delayed-release (Aspir-81) 81 mg tablet Take 1 Tab by mouth daily. 3/21/20   Reyes Delude, MD   amLODIPine (Norvasc) 10 mg tablet Take 1 Tab by mouth daily. 3/21/20   Reyes Delude, MD   chlorthalidone (HYGROTEN) 25 mg tablet Take  by mouth daily. Provider, Historical   insulin glargine (LANTUS) 100 unit/mL injection 70 Units by SubCUTAneous route two (2) times a day. 2/21/14   Niki Benton MD       Allergies:  No Known Allergies        Physical Exam:     Visit Vitals  /76   Pulse 74   Temp 98.7 °F (37.1 °C)   Resp 19   Ht 5' 11\" (1.803 m)   Wt 88.9 kg (196 lb)   SpO2 100%   BMI 27.34 kg/m²       Physical Exam:  General appearance: alert, cooperative, no distress, appears stated age  Head: Normocephalic, without obvious abnormality, atraumatic  Neck: supple, trachea midline  Lungs: clear to auscultation bilaterally  Heart: regular rate and rhythm, S1, S2 normal, no murmur, click, rub or gallop  Abdomen: soft, non-tender. Bowel sounds normal. No masses,  no organomegaly  Extremities: R large toe appears swollen , mild desquamation with area on lateral aspect of small ulceration and location of previous pus oozing.     Skin: Skin color, texture, turgor normal. No rashes or lesions  Neurologic: Grossly normal    Intake and Output:  Current Shift:  No intake/output data recorded. Last three shifts:  No intake/output data recorded.     Lab/Data Reviewed:  CMP:   Lab Results   Component Value Date/Time     (L) 07/31/2020 07:12 PM    K 4.4 07/31/2020 07:12 PM     07/31/2020 07:12 PM    CO2 21 07/31/2020 07:12 PM    AGAP 9 07/31/2020 07:12 PM    GLU 90 07/31/2020 07:12 PM    BUN 48 (H) 07/31/2020 07:12 PM    CREA 7.67 (H) 07/31/2020 07:12 PM    GFRAA 9 (L) 07/31/2020 07:12 PM    GFRNA 7 (L) 07/31/2020 07:12 PM    CA 8.8 07/31/2020 07:12 PM    MG 2.4 07/31/2020 07:12 PM    ALB 3.2 (L) 07/31/2020 07:12 PM    TP 9.5 (H) 07/31/2020 07:12 PM    GLOB 6.3 (H) 07/31/2020 07:12 PM    AGRAT 0.5 (L) 07/31/2020 07:12 PM    ALT 12 (L) 07/31/2020 07:12 PM     CBC:   Lab Results   Component Value Date/Time    WBC 8.3 07/31/2020 07:12 PM    HGB 10.2 (L) 07/31/2020 07:12 PM    HCT 30.5 (L) 07/31/2020 07:12 PM     07/31/2020 07:12 PM     All Cardiac Markers in the last 24 hours:   Lab Results   Component Value Date/Time     (H) 07/31/2020 07:12 PM    CKMB 7.8 (H) 07/31/2020 07:12 PM    CKND1 1.5 07/31/2020 07:12 PM    TROIQ 0.04 07/31/2020 07:12 PM       Joaquín Burrell MD    July 31, 2020

## 2020-08-01 NOTE — PROGRESS NOTES
TRANSFER - IN REPORT:    Verbal report received from [id number] RN(name) on Chandu Langley  [facility]      Report consisted of patients Situation, Background, Assessment and   Recommendations(SBAR). Information from the following report(s) SBAR and Kardex was reviewed with the receiving nurse. Opportunity for questions and clarification was provided. Assessment completed upon patients arrival to unit and care assumed.

## 2020-08-01 NOTE — PROGRESS NOTES
Pharmacy Dosing Services: Vancomycin    Indication: Bone/Joint Infection    Day of therapy: 1    Other Antimicrobials (Include dose, start day & day of therapy):  Piperacillin/Tazobactam (Zosyn)  Current Antimicrobial Therapy (168h ago, onward)      Ordered     Start Stop    20 0157  piperacillin-tazobactam (ZOSYN) 0.75 g in 0.9% sodium chloride 50 mL IVPB  0.75 g,   IntraVENous,   3 TIMES A WEEK(TUES,THUR, & SAT)      20 2100 --    20 0138  piperacillin-tazobactam (ZOSYN) 2.25 g in 0.9% sodium chloride (MBP/ADV) 50 mL MBP  2.25 g,   IntraVENous,   EVERY 12 HOURS      20 1100 --    20 0228  VANCOMYCIN INFORMATION NOTE  Other,   RX DOSING/MONITORING      20 0227 --    20 2331  vancomycin (VANCOCIN) 750 mg, 0.9% sodium chloride (MBP/ADV) 250 mL  IntraVENous,   ONCE      20 0100 20 1259              Loading dose (date given): 1750 mg  Current Maintenance dose:  New Start    Goal Vancomycin Level: Vancomycin Trough: 20 mcg/mL (meningitis/osteomyelitis)    Vancomycin Level (if drawn): No results for input(s): Arlean Person in the last 72 hours. Pt Weight Weight: 89.4 kg (197 lb)   Temperature Temp: 97.8 °F (36.6 °C)   Temp (72hrs), Av.2 °F (36.8 °C), Min:97.8 °F (36.6 °C), Max:98.7 °F (37.1 °C)     HR Pulse (Heart Rate): 73   BP BP: 121/75     Recent Labs     20  0320 20  1912   CREA 7.64* 7.67*   BUN 49* 48*   WBC 7.5 8.3     Estimated Creatinine Clearance Estimated Creatinine Clearance: 11.8 mL/min (A) (based on SCr of 7.64 mg/dL (H)).   Estimated Creatinine Clearance (using IBW): 11.8 mL/min      CAPD, Hemodialysis or Renal Replacement Therapy: Intermittent Hemodialysis (IHD)  Renal function stable? (unstable defined as SCr increase of 0.5 mg/dL or > 50% increase from baseline, whichever is greater) (Y/N): N     Significant Cultures:   Results       Procedure Component Value Units Date/Time    CULTURE, BLOOD [[id number]] Collected:  20 8905 Order Status:  Completed Specimen:  Blood Updated:  08/01/20 0713     Special Requests: NO SPECIAL REQUESTS        Culture result: NO GROWTH AFTER 2 HOURS       CULTURE, BLOOD [[id number]] Collected:  07/31/20 1949    Order Status:  Completed Specimen:  Blood Updated:  08/01/20 0713     Special Requests: NO SPECIAL REQUESTS        Culture result: NO GROWTH AFTER 2 HOURS                 Regimen assessment: Osteomyelitis of great toe of right foot ESRD CrCl 11.8 & Sr 7.6. Lab pending. Will start dosing once HD schedule is posted. Recommended maintenance dose   Maintenance dose:  Dose per level  Next scheduled level: Waiting on HD schedule       Pharmacy will follow daily and adjust medications as appropriate for renal function and/or serum levels.     Thank you,  Augustine Miller  Pharmacist  [phone]

## 2020-08-01 NOTE — ED PROVIDER NOTES
EMERGENCY DEPARTMENT HISTORY AND PHYSICAL EXAM      Date: 7/31/2020  Patient Name: Zari Cisneros    History of Presenting Illness     Chief Complaint   Patient presents with    Chest Pain    Toe Pain       History (Context): Zari Cisneros is a 48 y.o. gentleman with multiple comorbid medical conditions that are active and noted below in the past medical history who presents with gradual onset, progressive, severe right great toe pain associated with pus coming from it continuously and tenderness. The patient also has associated intermittent chest pain. This started days ago. On review of systems, the patient denies fever, chills. PCP: Rosy Worrell MD    Current Facility-Administered Medications   Medication Dose Route Frequency Provider Last Rate Last Dose    piperacillin-tazobactam (ZOSYN) 3.375 g in 0.9% sodium chloride (MBP/ADV) 100 mL MBP  3.375 g IntraVENous NOW Holden March MD        vancomycin (VANCOCIN) 1,750 mg in 0.9% sodium chloride 500 mL IVPB  1,750 mg IntraVENous NOW Holden March MD         Current Outpatient Medications   Medication Sig Dispense Refill    pregabalin (Lyrica) 100 mg capsule TAKE 1 CAPSULE BY MOUTH THREE TIMES DAILY 90 Cap 5    clopidogreL (PLAVIX) 75 mg tab Take 1 Tab by mouth daily. 30 Tab 3    atorvastatin (LIPITOR) 80 mg tablet Take 1 Tab by mouth daily for 180 days. 90 Tab 1    insulin lispro (HumaLOG U-100 Insulin) 100 unit/mL injection 40 Units by SubCUTAneous route daily. 1 Vial 0    carvediloL (COREG) 25 mg tablet Take 1 Tab by mouth two (2) times daily (with meals). For blood pressure/heart. Replaces metoprolol 60 Tab 10    fenofibrate (LOFIBRA) 54 mg tablet Take 1 Tab by mouth daily. 90 Tab 1    aspirin delayed-release (Aspir-81) 81 mg tablet Take 1 Tab by mouth daily. 90 Tab 1    amLODIPine (Norvasc) 10 mg tablet Take 1 Tab by mouth daily. 90 Tab 1    chlorthalidone (HYGROTEN) 25 mg tablet Take  by mouth daily.       insulin glargine (LANTUS) 100 unit/mL injection 70 Units by SubCUTAneous route two (2) times a day. 5 Vial 11       Past History     Past Medical History:  Past Medical History:   Diagnosis Date    CAD (coronary artery disease)     MI x3, 15 stents. 2008    Chronic kidney disease     Diabetes ([facility] [address]     Dx in late 25s    GERD (gastroesophageal reflux disease)     Hypertension        Past Surgical History:  Past Surgical History:   Procedure Laterality Date    CARDIAC SURG PROCEDURE UNLIST  2008    Stents (15)    HX ORTHOPAEDIC      left shoulder manipulation and bone spur removal       Family History:  Family History   Problem Relation Age of Onset    Diabetes Mother     Diabetes Father        Social History:  Social History     Tobacco Use    Smoking status: Current Some Day Smoker     Packs/day: 0.25     Years: 13.00     Pack years: 3.25    Smokeless tobacco: Never Used   Substance Use Topics    Alcohol use: No    Drug use: No       Allergies:  No Known Allergies    PMH, PSH, family history, social history, allergies reviewed with the patient with significant items noted above. Review of Systems   As per HPI, otherwise all systems reviewed and negative. Physical Exam     Vitals:    07/31/20 2000 07/31/20 2015 07/31/20 2030 07/31/20 2045   BP: 117/78 113/76 123/74 114/76   Pulse: 74 76 76 74   Resp: 20 21 19 19   Temp:       SpO2: 100% 100% 100% 100%   Weight:       Height:           Gen: Well-appearing, in no acute distress  HEENT: Normocephalic, sclera anicteric  Cardiovascular: Normal rate, regular rhythm, no murmurs, rubs, gallops. Pulses intact and equal distally. Pulmonary: No respiratory distress. No stridor. Clear lungs. ABD: Soft, nontender, nondistended. Neuro: Alert. Normal speech. Normal mentation. Psych: Normal thought content and thought processes. : No CVA tenderness  EXT: Significant swelling at the tip of the right great toe associated with a sinus tract that is draining pus. It is very tender. Nail is very thickened. Skin: Warm, erythematous rash as noted below and photo. Diagnostic Study Results     Labs -     Recent Results (from the past 12 hour(s))   EKG, 12 LEAD, INITIAL    Collection Time: 07/31/20  6:49 PM   Result Value Ref Range    Ventricular Rate 76 BPM    Atrial Rate 76 BPM    P-R Interval 172 ms    QRS Duration 108 ms    Q-T Interval 460 ms    QTC Calculation (Bezet) 517 ms    Calculated P Axis 19 degrees    Calculated R Axis 4 degrees    Calculated T Axis 45 degrees    Diagnosis       Normal sinus rhythm  Possible Anterior infarct , age undetermined  Prolonged QT  Abnormal ECG  When compared with ECG of 06-JUL-2019 19:39,  premature ventricular complexes are no longer present  Borderline criteria for Anterior infarct are now present     CBC WITH AUTOMATED DIFF    Collection Time: 07/31/20  7:12 PM   Result Value Ref Range    WBC 8.3 4.6 - 13.2 K/uL    RBC 3.66 (L) 4.70 - 5.50 M/uL    HGB 10.2 (L) 13.0 - 16.0 g/dL    HCT 30.5 (L) 36.0 - 48.0 %    MCV 83.3 74.0 - 97.0 FL    MCH 27.9 24.0 - 34.0 PG    MCHC 33.4 31.0 - 37.0 g/dL    RDW 14.8 (H) 11.6 - 14.5 %    PLATELET 177 611 - 887 K/uL    MPV 10.3 9.2 - 11.8 FL    NEUTROPHILS 76 (H) 40 - 73 %    LYMPHOCYTES 13 (L) 21 - 52 %    MONOCYTES 7 3 - 10 %    EOSINOPHILS 3 0 - 5 %    BASOPHILS 1 0 - 2 %    ABS. NEUTROPHILS 6.3 1.8 - 8.0 K/UL    ABS. LYMPHOCYTES 1.1 0.9 - 3.6 K/UL    ABS. MONOCYTES 0.6 0.05 - 1.2 K/UL    ABS. EOSINOPHILS 0.3 0.0 - 0.4 K/UL    ABS.  BASOPHILS 0.0 0.0 - 0.1 K/UL    DF AUTOMATED     CARDIAC PANEL,(CK, CKMB & TROPONIN)    Collection Time: 07/31/20  7:12 PM   Result Value Ref Range    CK - MB 7.8 (H) <3.6 ng/ml    CK-MB Index 1.5 0.0 - 4.0 %     (H) 39 - 308 U/L    Troponin-I, QT 0.04 0.0 - 1.150 NG/ML   METABOLIC PANEL, COMPREHENSIVE    Collection Time: 07/31/20  7:12 PM   Result Value Ref Range    Sodium 131 (L) 136 - 145 mmol/L    Potassium 4.4 3.5 - 5.5 mmol/L    Chloride 101 100 - 111 mmol/L    CO2 21 21 - 32 mmol/L    Anion gap 9 3.0 - 18 mmol/L    Glucose 90 74 - 99 mg/dL    BUN 48 (H) 7.0 - 18 MG/DL    Creatinine 7.67 (H) 0.6 - 1.3 MG/DL    BUN/Creatinine ratio 6 (L) 12 - 20      GFR est AA 9 (L) >60 ml/min/1.73m2    GFR est non-AA 7 (L) >60 ml/min/1.73m2    Calcium 8.8 8.5 - 10.1 MG/DL    Bilirubin, total 0.7 0.2 - 1.0 MG/DL    ALT (SGPT) 12 (L) 16 - 61 U/L    AST (SGOT) 26 10 - 38 U/L    Alk. phosphatase 92 45 - 117 U/L    Protein, total 9.5 (H) 6.4 - 8.2 g/dL    Albumin 3.2 (L) 3.4 - 5.0 g/dL    Globulin 6.3 (H) 2.0 - 4.0 g/dL    A-G Ratio 0.5 (L) 0.8 - 1.7     MAGNESIUM    Collection Time: 07/31/20  7:12 PM   Result Value Ref Range    Magnesium 2.4 1.6 - 2.6 mg/dL       Radiologic Studies -   XR FOOT RT MIN 3 V    (Results Pending)     CT Results  (Last 48 hours)    None        CXR Results  (Last 48 hours)    None            Medical Decision Making   I am the first provider for this patient. I reviewed the vital signs, available nursing notes, past medical history, past surgical history, family history and social history. Vital Signs-Reviewed the patient's vital signs. EKG: Interpreted by myself. Records Reviewed: Personally, on initial evaluation    MDM:   Patient presents with toe abscess with or without osteomyelitis. Exam significant for rash pictured. The patient is not systemically ill from this.   DDX considered: Abscess, felon, osteomyelitis  DDX thought to be less likely but also considered due to high risk condition: Cellulitis    Patient condition on initial evaluation: Stable    Plan:   Close Observation  Meds: Orders as below    Orders as below:  Orders Placed This Encounter    XR FOOT RT MIN 3 V    CBC WITH AUTOMATED DIFF    CARDIAC PANEL,(CK, CKMB & TROPONIN)    METABOLIC PANEL, COMPREHENSIVE    MAGNESIUM    D DIMER    SED RATE (ESR)    CRP, HIGH SENSITIVITY    IP CONSULT TO PODIATRY    EKG, 12 LEAD, INITIAL    piperacillin-tazobactam (ZOSYN) 3.375 g in 0.9% sodium chloride (MBP/ADV) 100 mL MBP    vancomycin (VANCOCIN) 1,750 mg in 0.9% sodium chloride 500 mL IVPB    IP CONSULT TO HOSPITALIST        ED Course:   Work-up as above demonstrated concern for significant osteomyelitis requiring toe amputation. Place consult to podiatry who will amputate the toe. See MAR for details of medication administration. Patient admitted without issue. Patient condition at time of disposition: very ill            Diagnosis     Clinical Impression:   1. Osteomyelitis of great toe of right foot ([address]        Signed,  Ubaldo Fuentes MD  Emergency Physician  [facility]  [facility]    As a voice dictation software was utilized to dictate this note, minor word transpositions can occur. I apologize for confusing wording and typographic errors. Please feel free to contact me for clarification.    EMERGENCY DEPARTMENT HISTORY AND PHYSICAL EXAM    .

## 2020-08-01 NOTE — PROGRESS NOTES
Comprehensive Nutrition Assessment    Type and Reason for Visit: Initial    Nutrition Recommendations/Plan:  1. Continue current diet: DIABETIC CONSISTENT CARB Regular; 1.5 GM NA  2. Monitor labs, weight and PO intake       Nutrition Assessment:  Admitted for osteomyelitis of great right toe. Podiatry consulted: Plan for surgery tomorrow (8/2) for toe amputation. Attempted to see Pt but was off the floor for dialysis. Malnutrition Assessment:  Malnutrition Status:  No malnutrition      Nutrition History and Allergies: NKFA or problems chewing/swallowing and weight appears to be stable per Hx but fluid fluctuations. Estimated Daily Nutrient Needs:  Energy (kcal):  2682  Protein (g):  107-116       Fluid (ml/day):  750-1500 mL    Nutrition Related Findings:  HD (TTS). Wounds:    Ulcer       Current Nutrition Therapies:   DIET DIABETIC CONSISTENT CARB Regular; 1.5 GM NA  DIET NPO    Anthropometric Measures:  · Height:  5' 11\" (180.3 cm)  · Current Body Wt:  89.4 kg (197 lb 1.5 oz)   · Admission Body Wt:  195 lb 15.8 oz    · Usual Body Wt:  195 lb     · Ideal Body Wt:  172 lbs:  114.6 %   · BMI Category: Overweight (BMI 25.0-29. 9)       Nutrition Diagnosis:   · Increased nutrient needs related to (wound healing) as evidenced by (osteomyelitis in great right toe with plan for amputation)      Nutrition Interventions:   Food and/or Nutrient Delivery: Continue current diet  Nutrition Education and Counseling: No recommendations at this time  Coordination of Nutrition Care: Continued inpatient monitoring    Goals:  PO nutrition intake will meet >75% of patient estimated nutritional needs within the next 7 days.        Nutrition Monitoring and Evaluation:   Food/Nutrient Intake Outcomes: Food and nutrient intake  Physical Signs/Symptoms Outcomes: Biochemical data, GI status, Skin, Weight    Discharge Planning:    Continue current diet     Chino Bear RDN  Pager: [phone]

## 2020-08-01 NOTE — PROGRESS NOTES
In Patient Progress note      Admit Date: 7/31/2020  Patient Active Problem List    Diagnosis Date Noted    ESRD (end stage renal disease) ([facility] 07/31/2020    Infection of toe 07/31/2020    Osteomyelitis of great toe of right foot ([facility] 07/31/2020    HTN (hypertension) 07/31/2020    Uncontrolled type 1 diabetes mellitus ([facility] 11/03/2014    Hyperlipidemia 11/03/2014    CKD (chronic kidney disease) 11/03/2014    CAD (coronary artery disease) 11/03/2014    Diabetic neuropathy ([facility] 11/03/2014       EVENTS over Night:   ESRD Pt admitted for R foot gangreen    Subjective:     Patient has no complaint of pain. .           Current Facility-Administered Medications:     amLODIPine (NORVASC) tablet 10 mg, 10 mg, Oral, DAILY, Paddy Leroy MD, Stopped at 08/01/20 0900    aspirin delayed-release tablet 81 mg, 81 mg, Oral, DAILY, Paddy Leroy MD, 81 mg at 08/01/20 0900    atorvastatin (LIPITOR) tablet 80 mg, 80 mg, Oral, DAILY, Paddy eLroy MD, 80 mg at 08/01/20 0494    carvediloL (COREG) tablet 25 mg, 25 mg, Oral, BID WITH MEALS, Paddy Leroy MD, Stopped at 08/01/20 0800    chlorthalidone (HYGROTEN) tablet 50 mg, 50 mg, Oral, DAILY, Paddy Leroy MD, Stopped at 08/01/20 0900    clopidogreL (PLAVIX) tablet 75 mg, 75 mg, Oral, DAILY, Paddy Leroy MD, 75 mg at 08/01/20 0959    insulin glargine (LANTUS) injection 30 Units, 30 Units, SubCUTAneous, BID, Paddy Leroy MD, 30 Units at 08/01/20 1001    pregabalin (LYRICA) capsule 100 mg, 100 mg, Oral, TID, Paddy Leroy MD, 100 mg at 08/01/20 1011    sodium chloride (NS) flush 5-40 mL, 5-40 mL, IntraVENous, Q8H, Paddy Leroy MD, Stopped at 08/01/20 0600    sodium chloride (NS) flush 5-40 mL, 5-40 mL, IntraVENous, PRN, Paddy Leroy MD, 10 mL at 08/01/20 0322    acetaminophen (TYLENOL) tablet 650 mg, 650 mg, Oral, Q6H PRN **OR** acetaminophen (TYLENOL) suppository 650 mg, 650 mg, Rectal, Q6H PRN, MD Carolyn Guillen polyethylene glycol (MIRALAX) packet 17 g, 17 g, Oral, DAILY PRN, Bobbi Curtis MD    promethazine (PHENERGAN) tablet 12.5 mg, 12.5 mg, Oral, Q6H PRN **OR** ondansetron (ZOFRAN) injection 4 mg, 4 mg, IntraVENous, Q6H PRN, Bobbi Curtis MD    heparin (porcine) injection 5,000 Units, 5,000 Units, SubCUTAneous, Q8H, Sushil HUFFMAN MD, Stopped at 08/01/20 1000    insulin lispro (HUMALOG) injection, , SubCUTAneous, AC&HS, Bobbi Curtis MD, Stopped at 08/01/20 0730    glucose chewable tablet 16 g, 4 Tab, Oral, PRN, Billy Maldonado MD    glucagon [facility]) injection 1 mg, 1 mg, IntraMUSCular, PRN, Bobbi Curtis MD    VANCOMYCIN INFORMATION NOTE, , Other, Rx Dosing/Monitoring, Bobbi Curtis MD    HYDROmorphone (DILAUDID) syringe 0.5 mg, 0.5 mg, IntraVENous, Q4H PRN, Bobbi Curtis MD, 0.5 mg at 08/01/20 1012    naloxone [facility]) injection 0.4 mg, 0.4 mg, IntraVENous, PRN, Bobbi Curtis MD    piperacillin-tazobactam (ZOSYN) 3.375 g in 0.9% sodium chloride (MBP/ADV) 100 mL MBP \"EXTENDED 4 HOUR INFUSION###\" , 3.375 g, IntraVENous, Q12H, Jaci Lucas H, DO      A comprehensive review of systems was negative. Objective:     Visit Vitals  /74   Pulse 79   Temp 97.9 °F (36.6 °C)   Resp 18   Ht 5' 11\" (1.803 m)   Wt 89.4 kg (197 lb)   SpO2 98%   BMI 27.48 kg/m²     07/30 1901 - 08/01 0700  In: -   Out: 200 [Urine:200]    Physical Exam:   GENERAL: alert, cooperative, no distress, appears stated age,   EYE: negative,   LYMPHATIC: Cervical, supraclavicular, and axillary nodes normal. ,   THROAT & NECK: normal and no erythema or exudates noted. ,   LUNG: clear to auscultation bilaterally   HEART: regular rate and rhythm, S1, S2 normal, no murmur, click, rub or gallop   ABDOMEN: soft, non-tender. Bowel sounds normal. No masses,  no organomegaly   EXTREMITIES:  extremities normal, atraumatic, no cyanosis or edema   SKIN: purpura / ecchymosis noted on R FOOT   NEUROLOGIC: AOx3.  Gait normal. Reflexes and motor strength normal and symmetric. Cranial nerves 2-12 and sensation grossly intact. PSYCHIATRIC: non focal        Data Review:    Lab Results   Component Value Date/Time    WBC 7.5 08/01/2020 03:20 AM    HGB 10.5 (L) 08/01/2020 03:20 AM    HCT 32.3 (L) 08/01/2020 03:20 AM    PLATELET 753 52/20/8136 03:20 AM    MCV 84.6 08/01/2020 03:20 AM     Lab Results   Component Value Date/Time    Sodium 134 (L) 08/01/2020 03:20 AM    Potassium 4.0 08/01/2020 03:20 AM    Chloride 101 08/01/2020 03:20 AM    CO2 23 08/01/2020 03:20 AM    Anion gap 10 08/01/2020 03:20 AM    Glucose 87 08/01/2020 03:20 AM    BUN 49 (H) 08/01/2020 03:20 AM    Creatinine 7.64 (H) 08/01/2020 03:20 AM    BUN/Creatinine ratio 6 (L) 08/01/2020 03:20 AM    GFR est AA 9 (L) 08/01/2020 03:20 AM    GFR est non-AA 7 (L) 08/01/2020 03:20 AM    Calcium 8.3 (L) 08/01/2020 03:20 AM     No components found for: PTHINT  No results found for: IRON    Impression:   ESRD_ TTS HD earlier today  PVD- R foot gangren  Anemia- at goal    Plan:      Will see on Monday        Paige Stallings  [place] [phone]  Cell [phone]

## 2020-08-01 NOTE — PROGRESS NOTES
TRANSFER - OUT REPORT:    Verbal report given to Carlie Hanks RN(name) on Clarissa Rodriguez  being transferred to Dialysis(unit) for routine progression of care       Report consisted of patients Situation, Background, Assessment and   Recommendations(SBAR). Information from the following report(s) SBAR and Kardex was reviewed with the receiving nurse. Lines:   Peripheral IV 07/31/20 Left Antecubital (Active)   Site Assessment Clean, dry, & intact 08/01/20 0400   Phlebitis Assessment 0 08/01/20 0400   Infiltration Assessment 0 08/01/20 0400   Dressing Status Clean, dry, & intact 08/01/20 0400   Dressing Type Tape;Transparent 08/01/20 0400   Hub Color/Line Status Green; Infusing 08/01/20 0400   Action Taken Open ports on tubing capped 08/01/20 0400   Alcohol Cap Used Yes 08/01/20 0400        Opportunity for questions and clarification was provided.       Patient transported with:   [employer]

## 2020-08-02 ENCOUNTER — ANESTHESIA (OUTPATIENT)
Dept: SURGERY | Age: 55
DRG: 314 | End: 2020-08-02
Payer: MEDICAID

## 2020-08-02 ENCOUNTER — ANESTHESIA EVENT (OUTPATIENT)
Dept: SURGERY | Age: 55
DRG: 314 | End: 2020-08-02
Payer: MEDICAID

## 2020-08-02 LAB
ANION GAP SERPL CALC-SCNC: 11 MMOL/L (ref 3–18)
ATRIAL RATE: 76 BPM
BUN SERPL-MCNC: 25 MG/DL (ref 7–18)
BUN/CREAT SERPL: 5 (ref 12–20)
CALCIUM SERPL-MCNC: 8 MG/DL (ref 8.5–10.1)
CALCULATED P AXIS, ECG09: 19 DEGREES
CALCULATED R AXIS, ECG10: 4 DEGREES
CALCULATED T AXIS, ECG11: 45 DEGREES
CHLORIDE SERPL-SCNC: 101 MMOL/L (ref 100–111)
CO2 SERPL-SCNC: 22 MMOL/L (ref 21–32)
COVID-19 RAPID TEST, COVR: NOT DETECTED
CREAT SERPL-MCNC: 5.08 MG/DL (ref 0.6–1.3)
DIAGNOSIS, 93000: NORMAL
ERYTHROCYTE [DISTWIDTH] IN BLOOD BY AUTOMATED COUNT: 14.6 % (ref 11.6–14.5)
GLUCOSE BLD STRIP.AUTO-MCNC: 109 MG/DL (ref 70–110)
GLUCOSE BLD STRIP.AUTO-MCNC: 111 MG/DL (ref 70–110)
GLUCOSE BLD STRIP.AUTO-MCNC: 46 MG/DL (ref 70–110)
GLUCOSE BLD STRIP.AUTO-MCNC: 65 MG/DL (ref 70–110)
GLUCOSE BLD STRIP.AUTO-MCNC: 68 MG/DL (ref 70–110)
GLUCOSE BLD STRIP.AUTO-MCNC: 80 MG/DL (ref 70–110)
GLUCOSE BLD STRIP.AUTO-MCNC: 90 MG/DL (ref 70–110)
GLUCOSE SERPL-MCNC: 94 MG/DL (ref 74–99)
HCT VFR BLD AUTO: 29.9 % (ref 36–48)
HGB BLD-MCNC: 9.8 G/DL (ref 13–16)
MCH RBC QN AUTO: 27.5 PG (ref 24–34)
MCHC RBC AUTO-ENTMCNC: 32.8 G/DL (ref 31–37)
MCV RBC AUTO: 83.8 FL (ref 74–97)
P-R INTERVAL, ECG05: 172 MS
PLATELET # BLD AUTO: 266 K/UL (ref 135–420)
PMV BLD AUTO: 10.4 FL (ref 9.2–11.8)
POTASSIUM SERPL-SCNC: 4 MMOL/L (ref 3.5–5.5)
Q-T INTERVAL, ECG07: 460 MS
QRS DURATION, ECG06: 108 MS
QTC CALCULATION (BEZET), ECG08: 517 MS
RBC # BLD AUTO: 3.57 M/UL (ref 4.7–5.5)
SODIUM SERPL-SCNC: 134 MMOL/L (ref 136–145)
SOURCE, COVRS: NORMAL
VENTRICULAR RATE, ECG03: 76 BPM
WBC # BLD AUTO: 7.3 K/UL (ref 4.6–13.2)

## 2020-08-02 PROCEDURE — 77030031139 HC SUT VCRL2 J&J -A: Performed by: PODIATRIST

## 2020-08-02 PROCEDURE — 74011636637 HC RX REV CODE- 636/637: Performed by: HOSPITALIST

## 2020-08-02 PROCEDURE — 74011250636 HC RX REV CODE- 250/636: Performed by: NURSE ANESTHETIST, CERTIFIED REGISTERED

## 2020-08-02 PROCEDURE — 74011000250 HC RX REV CODE- 250: Performed by: HOSPITALIST

## 2020-08-02 PROCEDURE — 36415 COLL VENOUS BLD VENIPUNCTURE: CPT

## 2020-08-02 PROCEDURE — 74011000250 HC RX REV CODE- 250: Performed by: NURSE ANESTHETIST, CERTIFIED REGISTERED

## 2020-08-02 PROCEDURE — 88305 TISSUE EXAM BY PATHOLOGIST: CPT

## 2020-08-02 PROCEDURE — 85027 COMPLETE CBC AUTOMATED: CPT

## 2020-08-02 PROCEDURE — 87635 SARS-COV-2 COVID-19 AMP PRB: CPT

## 2020-08-02 PROCEDURE — 0Y6P0Z0 DETACHMENT AT RIGHT 1ST TOE, COMPLETE, OPEN APPROACH: ICD-10-PCS | Performed by: PODIATRIST

## 2020-08-02 PROCEDURE — 65270000029 HC RM PRIVATE

## 2020-08-02 PROCEDURE — 77010033678 HC OXYGEN DAILY

## 2020-08-02 PROCEDURE — 76210000016 HC OR PH I REC 1 TO 1.5 HR: Performed by: PODIATRIST

## 2020-08-02 PROCEDURE — 88311 DECALCIFY TISSUE: CPT

## 2020-08-02 PROCEDURE — 87205 SMEAR GRAM STAIN: CPT

## 2020-08-02 PROCEDURE — 77030002916 HC SUT ETHLN J&J -A: Performed by: PODIATRIST

## 2020-08-02 PROCEDURE — 74011000258 HC RX REV CODE- 258: Performed by: HOSPITALIST

## 2020-08-02 PROCEDURE — 74011250637 HC RX REV CODE- 250/637: Performed by: HOSPITALIST

## 2020-08-02 PROCEDURE — 74011250637 HC RX REV CODE- 250/637: Performed by: PODIATRIST

## 2020-08-02 PROCEDURE — 80048 BASIC METABOLIC PNL TOTAL CA: CPT

## 2020-08-02 PROCEDURE — 76010000138 HC OR TIME 0.5 TO 1 HR: Performed by: PODIATRIST

## 2020-08-02 PROCEDURE — 76060000032 HC ANESTHESIA 0.5 TO 1 HR: Performed by: PODIATRIST

## 2020-08-02 PROCEDURE — 74011250636 HC RX REV CODE- 250/636: Performed by: HOSPITALIST

## 2020-08-02 PROCEDURE — 74011000250 HC RX REV CODE- 250: Performed by: PODIATRIST

## 2020-08-02 PROCEDURE — 74011000272 HC RX REV CODE- 272: Performed by: PODIATRIST

## 2020-08-02 PROCEDURE — 82962 GLUCOSE BLOOD TEST: CPT

## 2020-08-02 PROCEDURE — 87075 CULTR BACTERIA EXCEPT BLOOD: CPT

## 2020-08-02 RX ORDER — NALOXONE HYDROCHLORIDE 0.4 MG/ML
0.2 INJECTION, SOLUTION INTRAMUSCULAR; INTRAVENOUS; SUBCUTANEOUS AS NEEDED
Status: DISCONTINUED | OUTPATIENT
Start: 2020-08-02 | End: 2020-08-02 | Stop reason: HOSPADM

## 2020-08-02 RX ORDER — BUPIVACAINE HYDROCHLORIDE 2.5 MG/ML
INJECTION, SOLUTION EPIDURAL; INFILTRATION; INTRACAUDAL AS NEEDED
Status: DISCONTINUED | OUTPATIENT
Start: 2020-08-02 | End: 2020-08-02 | Stop reason: HOSPADM

## 2020-08-02 RX ORDER — PROPOFOL 10 MG/ML
INJECTION, EMULSION INTRAVENOUS
Status: DISCONTINUED | OUTPATIENT
Start: 2020-08-02 | End: 2020-08-02 | Stop reason: HOSPADM

## 2020-08-02 RX ORDER — FENTANYL CITRATE 50 UG/ML
INJECTION, SOLUTION INTRAMUSCULAR; INTRAVENOUS AS NEEDED
Status: DISCONTINUED | OUTPATIENT
Start: 2020-08-02 | End: 2020-08-02 | Stop reason: HOSPADM

## 2020-08-02 RX ORDER — FENTANYL CITRATE 50 UG/ML
50 INJECTION, SOLUTION INTRAMUSCULAR; INTRAVENOUS
Status: DISCONTINUED | OUTPATIENT
Start: 2020-08-02 | End: 2020-08-02 | Stop reason: HOSPADM

## 2020-08-02 RX ORDER — SODIUM CHLORIDE 0.9 % (FLUSH) 0.9 %
5-40 SYRINGE (ML) INJECTION AS NEEDED
Status: DISCONTINUED | OUTPATIENT
Start: 2020-08-02 | End: 2020-08-03 | Stop reason: HOSPADM

## 2020-08-02 RX ORDER — SODIUM CHLORIDE 0.9 % (FLUSH) 0.9 %
5-40 SYRINGE (ML) INJECTION EVERY 8 HOURS
Status: DISCONTINUED | OUTPATIENT
Start: 2020-08-02 | End: 2020-08-03 | Stop reason: HOSPADM

## 2020-08-02 RX ORDER — LIDOCAINE HYDROCHLORIDE 20 MG/ML
INJECTION, SOLUTION EPIDURAL; INFILTRATION; INTRACAUDAL; PERINEURAL AS NEEDED
Status: DISCONTINUED | OUTPATIENT
Start: 2020-08-02 | End: 2020-08-02 | Stop reason: HOSPADM

## 2020-08-02 RX ORDER — ONDANSETRON 2 MG/ML
INJECTION INTRAMUSCULAR; INTRAVENOUS AS NEEDED
Status: DISCONTINUED | OUTPATIENT
Start: 2020-08-02 | End: 2020-08-02 | Stop reason: HOSPADM

## 2020-08-02 RX ORDER — HYDROCODONE BITARTRATE AND ACETAMINOPHEN 5; 325 MG/1; MG/1
2 TABLET ORAL
Status: DISCONTINUED | OUTPATIENT
Start: 2020-08-02 | End: 2020-08-03

## 2020-08-02 RX ORDER — MAGNESIUM SULFATE 100 %
4 CRYSTALS MISCELLANEOUS AS NEEDED
Status: DISCONTINUED | OUTPATIENT
Start: 2020-08-02 | End: 2020-08-02 | Stop reason: SDUPTHER

## 2020-08-02 RX ORDER — ONDANSETRON 2 MG/ML
4 INJECTION INTRAMUSCULAR; INTRAVENOUS
Status: DISCONTINUED | OUTPATIENT
Start: 2020-08-02 | End: 2020-08-02 | Stop reason: HOSPADM

## 2020-08-02 RX ORDER — SODIUM CHLORIDE 9 MG/ML
25 INJECTION, SOLUTION INTRAVENOUS CONTINUOUS
Status: DISCONTINUED | OUTPATIENT
Start: 2020-08-02 | End: 2020-08-02 | Stop reason: HOSPADM

## 2020-08-02 RX ORDER — MIDAZOLAM HYDROCHLORIDE 1 MG/ML
INJECTION, SOLUTION INTRAMUSCULAR; INTRAVENOUS AS NEEDED
Status: DISCONTINUED | OUTPATIENT
Start: 2020-08-02 | End: 2020-08-02 | Stop reason: HOSPADM

## 2020-08-02 RX ORDER — DEXTROSE MONOHYDRATE AND SODIUM CHLORIDE 5; .45 G/100ML; G/100ML
75 INJECTION, SOLUTION INTRAVENOUS CONTINUOUS
Status: DISCONTINUED | OUTPATIENT
Start: 2020-08-02 | End: 2020-08-03 | Stop reason: HOSPADM

## 2020-08-02 RX ORDER — INSULIN LISPRO 100 [IU]/ML
INJECTION, SOLUTION INTRAVENOUS; SUBCUTANEOUS ONCE
Status: DISCONTINUED | OUTPATIENT
Start: 2020-08-02 | End: 2020-08-02 | Stop reason: HOSPADM

## 2020-08-02 RX ORDER — FENTANYL CITRATE 50 UG/ML
25 INJECTION, SOLUTION INTRAMUSCULAR; INTRAVENOUS
Status: DISCONTINUED | OUTPATIENT
Start: 2020-08-02 | End: 2020-08-02 | Stop reason: HOSPADM

## 2020-08-02 RX ORDER — SODIUM CHLORIDE 9 MG/ML
INJECTION, SOLUTION INTRAVENOUS
Status: DISCONTINUED | OUTPATIENT
Start: 2020-08-02 | End: 2020-08-02 | Stop reason: HOSPADM

## 2020-08-02 RX ADMIN — HYDROCODONE BITARTRATE AND ACETAMINOPHEN 2 TABLET: 5; 325 TABLET ORAL at 23:20

## 2020-08-02 RX ADMIN — ONDANSETRON 4 MG: 2 SOLUTION INTRAMUSCULAR; INTRAVENOUS at 11:59

## 2020-08-02 RX ADMIN — DEXTROSE 125 ML/HR: 10 SOLUTION INTRAVENOUS at 11:13

## 2020-08-02 RX ADMIN — SODIUM CHLORIDE 750 MG: 900 INJECTION, SOLUTION INTRAVENOUS at 20:31

## 2020-08-02 RX ADMIN — FENTANYL CITRATE 25 MCG: 50 INJECTION, SOLUTION INTRAMUSCULAR; INTRAVENOUS at 12:05

## 2020-08-02 RX ADMIN — HEPARIN SODIUM 5000 UNITS: 5000 INJECTION INTRAVENOUS; SUBCUTANEOUS at 18:26

## 2020-08-02 RX ADMIN — MIDAZOLAM 1 MG: 1 INJECTION INTRAMUSCULAR; INTRAVENOUS at 11:45

## 2020-08-02 RX ADMIN — LIDOCAINE HYDROCHLORIDE 60 MG: 20 INJECTION, SOLUTION INTRAVENOUS at 11:45

## 2020-08-02 RX ADMIN — FENTANYL CITRATE 50 MCG: 50 INJECTION, SOLUTION INTRAMUSCULAR; INTRAVENOUS at 11:45

## 2020-08-02 RX ADMIN — CARVEDILOL 25 MG: 25 TABLET, FILM COATED ORAL at 18:23

## 2020-08-02 RX ADMIN — HYDROMORPHONE HYDROCHLORIDE 0.5 MG: 1 INJECTION, SOLUTION INTRAMUSCULAR; INTRAVENOUS; SUBCUTANEOUS at 20:31

## 2020-08-02 RX ADMIN — HYDROMORPHONE HYDROCHLORIDE 0.5 MG: 1 INJECTION, SOLUTION INTRAMUSCULAR; INTRAVENOUS; SUBCUTANEOUS at 16:24

## 2020-08-02 RX ADMIN — INSULIN GLARGINE 30 UNITS: 100 INJECTION, SOLUTION SUBCUTANEOUS at 18:25

## 2020-08-02 RX ADMIN — DEXTROSE MONOHYDRATE AND SODIUM CHLORIDE 75 ML/HR: 5; .45 INJECTION, SOLUTION INTRAVENOUS at 10:06

## 2020-08-02 RX ADMIN — PREGABALIN 100 MG: 25 CAPSULE ORAL at 23:22

## 2020-08-02 RX ADMIN — FENTANYL CITRATE 25 MCG: 50 INJECTION, SOLUTION INTRAMUSCULAR; INTRAVENOUS at 12:13

## 2020-08-02 RX ADMIN — Medication 10 ML: at 07:07

## 2020-08-02 RX ADMIN — PIPERACILLIN AND TAZOBACTAM 3.38 G: 3; .375 INJECTION, POWDER, LYOPHILIZED, FOR SOLUTION INTRAVENOUS at 02:08

## 2020-08-02 RX ADMIN — Medication 10 ML: at 14:00

## 2020-08-02 RX ADMIN — SODIUM CHLORIDE: 900 INJECTION, SOLUTION INTRAVENOUS at 11:44

## 2020-08-02 RX ADMIN — PREGABALIN 100 MG: 25 CAPSULE ORAL at 18:23

## 2020-08-02 RX ADMIN — PROPOFOL 140 MCG/KG/MIN: 10 INJECTION, EMULSION INTRAVENOUS at 11:47

## 2020-08-02 RX ADMIN — HYDROMORPHONE HYDROCHLORIDE 0.5 MG: 1 INJECTION, SOLUTION INTRAMUSCULAR; INTRAVENOUS; SUBCUTANEOUS at 07:06

## 2020-08-02 RX ADMIN — MIDAZOLAM 1 MG: 1 INJECTION INTRAMUSCULAR; INTRAVENOUS at 11:46

## 2020-08-02 RX ADMIN — HYDROMORPHONE HYDROCHLORIDE 0.5 MG: 1 INJECTION, SOLUTION INTRAMUSCULAR; INTRAVENOUS; SUBCUTANEOUS at 02:08

## 2020-08-02 NOTE — PROGRESS NOTES
19:25- Report received from previous nurse, Maverick Clemente RN.   20:00- assessment completed- see flow sheet. Right foot dressing clean, dry and intact. 21:43- CNA Carlton Reynoso reported patient's blood glucose=46  Patient alert, given apple juice 240 cc. Accucheck  BG re-check=109  Patient has IV fluids ordered and re-started as they were off. Continue to monitor- Call light in reach. .  23:20- Med with norco 2 tabs po for c/o right foot pain. 8/3/2020  02:12- Patient medicated with dilaudid 0.5 mg IV for c/o right foot pain rated as a \"seven\" with good effect reported. Patient resting quietly. Call light in reach. 03:25- Morning labs drawn. 06:07- Phlebotomist in the room with patient- patient had call light on and asked for a clean gown and asked how he can get a copy of the operation report; his question was answered and clean gown put on the bedside table as lab work was being drawn. 06:22-Patient requested pain med (dilaudid) -this writer informed him that he can have the dilaudid as it had been about exactly 4 hours since last given and that the med to be obtained at this time. 06:26- Dilaudid was pulled from the [employer]. . This writer went right back to his room to administer it and found that the patient was not in the bed but in the bathroom getting completely washed up and insisted that he is washing his underwear. The IV pump was beeping, the IV fluids were stopped and IV was taped more and covered with a large glove so not to get wet. The patient was standing at the sink washing himself and then the underwear! He was informed that the dilaudid was brought as he asked (for pain)   The patient told this writer to come back-to go see another patient. This writer remained in the room waiting for the patient, changing the bed linen, and cleaning the over bed table and tending to the trash in the trash can.    The patient remained in the bathroom-this writer went to the room next door for a brief amount of time then returned back to the patient's room. Patient was just getting out of the bathroom and noted that the IV was dislodged and the site was bleeding. 07:10- Patient returned to bed (noted to be ambulating with a steady gait, no noted facial grimacing)  and the IV was discontinued, cleaned and small gauze applied. He was informed that the dilaudid can not be given now and that until a new IV can be inserted, that the norco was encouraged to take. Patient agreed and that he will take the norco.  07:24- Med with [place] 2 tabs po. Iv supplies brought to the bedside. Patient was informed that the oncoming nurse is waiting for report to be given and that she will be informed that a new IV access is needed. Patient calling at the  asking for a nurse to put in a new IV. Oncoming charge nurse, Trupti Cadena RN was made aware of the situation. Bedside Report on patient [place] was given first to Oncoming nurse, Gildardo Huynh RN. Oncoming nurse went into room to attempt a new IV insert.

## 2020-08-02 NOTE — PROGRESS NOTES
TRANSFER - IN REPORT:    Verbal report received from TARAH Lyle(name[facility]  [facility] [employer]) for routine progression of care      Report consisted of patients Situation, Background, Assessment and   Recommendations(SBAR). Information from the following report(s) SBAR and Kardex was reviewed with the receiving nurse. Opportunity for questions and clarification was provided. Assessment completed upon patients arrival to unit and care assumed.

## 2020-08-02 NOTE — PROGRESS NOTES
Problem: Pain  Goal: *Control of Pain  Outcome: Progressing Towards Goal     Problem: Patient Education: Go to Patient Education Activity  Goal: Patient/Family Education  Outcome: Progressing Towards Goal     Problem: Falls - Risk of  Goal: *Absence of Falls  Description: Document Marsha McEwen Fall Risk and appropriate interventions in the flowsheet.   Outcome: Progressing Towards Goal  Note: Fall Risk Interventions:  Mobility Interventions: Bed/chair exit alarm, Communicate number of staff needed for ambulation/transfer, OT consult for ADLs, Patient to call before getting OOB, PT Consult for mobility concerns, Utilize walker, cane, or other assistive device         Medication Interventions: Assess postural VS orthostatic hypotension, Bed/chair exit alarm, Evaluate medications/consider consulting pharmacy, Patient to call before getting OOB, Teach patient to arise slowly    Elimination Interventions: Bed/chair exit alarm, Call light in reach, Patient to call for help with toileting needs, Toilet paper/wipes in reach, Toileting schedule/hourly rounds, Urinal in reach              Problem: Patient Education: Go to Patient Education Activity  Goal: Patient/Family Education  Outcome: Progressing Towards Goal

## 2020-08-02 NOTE — BRIEF OP NOTE
Brief Postoperative Note    Patient: Arloa Hammans  YOB: 1965  MRN: [id number]    Date of Procedure: 8/2/2020     Pre-Op Diagnosis: Ulcer of right foot, unspecified ulcer stage ([facility] [L97.519], osteomyelitis  Post-Op Diagnosis: Same as preoperative diagnosis. Procedure(s):  AMPUTATION right great TOE - clean margin    Surgeon(s):  Arabella Howell DPM    Surgical Assistant: None    Anesthesia: MAC     Estimated Blood Loss (mL): less than 50     Complications: None    Specimens:   ID Type Source Tests Collected by Time Destination   1 : RIGHT BIG TOE Preservative   Arabella Howell DPM 8/2/2020 1206 Pathology   1 : RIGHT BIG TOE WOUND CULTURE FOR AEROBIC AND ANAEROBIC CULTURE Wound  CULTURE, AEROBIC AND ANAEROBIC Arabella Howell DPM 8/2/2020 1210 Microbiology        Implants: * No implants in log *    Drains: * No LDAs found *    Findings: Good bleeding noted. No infection proximal to the 1st MPJ. Clean margin amputation, all infected bone and tissue removed    Plan: anticipate patient to be stable for DC from podiatric standpoint following dressing change, in 24-48hours with 7-10 days of PO abx.     Electronically Signed by Nikolay Soria DPM on 8/2/2020 at 12:16 PM

## 2020-08-02 NOTE — ANESTHESIA POSTPROCEDURE EVALUATION
Procedure(s):  AMPUTATION right great TOE - clean margin . MAC    Anesthesia Post Evaluation      Multimodal analgesia: multimodal analgesia used between 6 hours prior to anesthesia start to PACU discharge  Patient location during evaluation: bedside  Patient participation: complete - patient participated  Level of consciousness: awake  Pain management: adequate  Airway patency: patent  Anesthetic complications: no  Cardiovascular status: stable  Respiratory status: acceptable  Hydration status: acceptable  Post anesthesia nausea and vomiting:  controlled      INITIAL Post-op Vital signs:   Vitals Value Taken Time   /82 8/2/2020 12:50 PM   Temp 36.4 °C (97.5 °F) 8/2/2020 12:22 PM   Pulse 75 8/2/2020 12:57 PM   Resp 22 8/2/2020 12:57 PM   SpO2 100 % 8/2/2020 12:57 PM   Vitals shown include unvalidated device data.

## 2020-08-02 NOTE — PERIOP NOTES
Recd care of pt from OR via bed. Attached to monitor. VSS. OR, MAR and anesthesia report acknowledged. Will cont to monitor. 1300 TRANSFER - OUT REPORT:    Verbal report given to Lelan Soulier (name) on Katerin Stiles  being transferred to 2200 (unit) for routine post - op       Report consisted of patients Situation, Background, Assessment and   Recommendations(SBAR). Information from the following report(s) SBAR, OR Summary, Recent Results and Cardiac Rhythm sinus rhythm was reviewed with the receiving nurse. Lines:   Peripheral IV 07/31/20 Left Antecubital (Active)   Site Assessment Clean, dry, & intact 08/02/20 1220   Phlebitis Assessment 0 08/02/20 1220   Infiltration Assessment 0 08/02/20 1220   Dressing Status Clean, dry, & intact 08/02/20 1220   Dressing Type Transparent;Tape 08/02/20 1220   Hub Color/Line Status Green; Infusing 08/02/20 1220   Action Taken Open ports on tubing capped 08/02/20 1220   Alcohol Cap Used Yes 08/02/20 1220        Opportunity for questions and clarification was provided.       Patient transported with:   [employer]

## 2020-08-02 NOTE — PROGRESS NOTES
8/1/2020  19:30- Report received from previous nurse, Saima Howell, [facility]. Assessment completed- see flow sheet. 21:23- Med with dilaudid 0.5 mg IV for c/o right great toe pain reported as \"throbbing, aching\"    22:10= CNCurtis CHAO reported accucheck BG=65  Patient reports feeling light headed, and \"sweaty\"  Given applejuice and crackers. 22:40- Recheck BG=76 Patient reports feeling better. Patient ate about 4 packages of crackers, ginger ale. Continue to monitor. 8/2/2020  00;10- No change in condition. Resting quietly with eyes closed. 02:10- Scheduled zosyn started at this time. Med with prn dilaudid for right great toe pain with good effect reported. 05:30- Patient bending his left arm frequently and causing IV pump to sound- staff nurses frequently going into his room and reminding him to keep left arm positioned straight so IV pump won't alarm. Patient return to sleep after pump reset. Call light in reach. 07:00- Charge nurse, Michelle Juarez RN reported patient wants the prn pain med now. Dilaudid 0.5 mg administered for c/o right great toe pain.  07:20- Report given to oncoming nurse, Saima Howell RN>

## 2020-08-02 NOTE — PROGRESS NOTES
Internal Medicine Progress Note    Patient's Name: Deyvi Miller  Admit Date: 7/31/2020  Length of Stay: 2      Assessment/Plan     Active Hospital Problems    Diagnosis Date Noted    ESRD (end stage renal disease) ([facility] 07/31/2020    Osteomyelitis of great toe of right foot ([facility] 07/31/2020    HTN (hypertension) 07/31/2020    Uncontrolled type 1 diabetes mellitus ([facility] 11/03/2014    Hyperlipidemia 11/03/2014     - Afebrile, no leukocytosis  - Cont IVAB  - Plan for amputation today per podiatry  - Pain control PRN  - Expect clean margins  - Nephro managing dialysis  - Cont acceptable home medications for chronic conditions   - DVT protocol    I have personally reviewed all pertinent labs and films that have officially resulted over the last 24 hours. I have personally checked for all pending labs that are awaiting final results.     Subjective     Pt s/e @ bedside  No major events overnight  Ready for surgery  Pain tolerable when he gets meds  Denies CP or SOB    Objective     Visit Vitals  /90 (BP 1 Location: Right arm, BP Patient Position: Head of bed elevated (Comment degrees))   Pulse 69   Temp 98.8 °F (37.1 °C)   Resp 17   Ht 5' 11\" (1.803 m)   Wt 93.4 kg (205 lb 12.8 oz)   SpO2 97%   BMI 28.70 kg/m²       Physical Exam:  General Appearance: NAD, conversant  Lungs: CTA with normal respiratory effort  CV: RRR, no m/r/g  Abdomen: soft, non-tender, normal bowel sounds  Extremities: no cyanosis, no peripheral edema, RLE w/ dressing in place  Neuro: No focal deficits, motor/sensory intact    Lab/Data Reviewed:  BMP:   Lab Results   Component Value Date/Time     (L) 08/02/2020 03:15 AM    K 4.0 08/02/2020 03:15 AM     08/02/2020 03:15 AM    CO2 22 08/02/2020 03:15 AM    AGAP 11 08/02/2020 03:15 AM    GLU 94 08/02/2020 03:15 AM    BUN 25 (H) 08/02/2020 03:15 AM    CREA 5.08 (H) 08/02/2020 03:15 AM    GFRAA 14 (L) 08/02/2020 03:15 AM    GFRNA 12 (L) 08/02/2020 03:15 AM     CBC:   Lab Results Component Value Date/Time    WBC 7.3 08/02/2020 03:15 AM    HGB 9.8 (L) 08/02/2020 03:15 AM    HCT 29.9 (L) 08/02/2020 03:15 AM     08/02/2020 03:15 AM       Imaging Reviewed:  No results found.     Medications Reviewed:  Current Facility-Administered Medications   Medication Dose Route Frequency    dextrose 5 % - 0.45% NaCl infusion  75 mL/hr IntraVENous CONTINUOUS    amLODIPine (NORVASC) tablet 10 mg  10 mg Oral DAILY    aspirin delayed-release tablet 81 mg  81 mg Oral DAILY    atorvastatin (LIPITOR) tablet 80 mg  80 mg Oral DAILY    carvediloL (COREG) tablet 25 mg  25 mg Oral BID WITH MEALS    chlorthalidone (HYGROTEN) tablet 50 mg  50 mg Oral DAILY    clopidogreL (PLAVIX) tablet 75 mg  75 mg Oral DAILY    insulin glargine (LANTUS) injection 30 Units  30 Units SubCUTAneous BID    pregabalin (LYRICA) capsule 100 mg  100 mg Oral TID    sodium chloride (NS) flush 5-40 mL  5-40 mL IntraVENous Q8H    sodium chloride (NS) flush 5-40 mL  5-40 mL IntraVENous PRN    acetaminophen (TYLENOL) tablet 650 mg  650 mg Oral Q6H PRN    Or    acetaminophen (TYLENOL) suppository 650 mg  650 mg Rectal Q6H PRN    polyethylene glycol (MIRALAX) packet 17 g  17 g Oral DAILY PRN    promethazine (PHENERGAN) tablet 12.5 mg  12.5 mg Oral Q6H PRN    Or    ondansetron (ZOFRAN) injection 4 mg  4 mg IntraVENous Q6H PRN    heparin (porcine) injection 5,000 Units  5,000 Units SubCUTAneous Q8H    insulin lispro (HUMALOG) injection   SubCUTAneous AC&HS    glucose chewable tablet 16 g  4 Tab Oral PRN    glucagon (GLUCAGEN) injection 1 mg  1 mg IntraMUSCular PRN    VANCOMYCIN INFORMATION NOTE   Other Rx Dosing/Monitoring    HYDROmorphone (DILAUDID) syringe 0.5 mg  0.5 mg IntraVENous Q4H PRN    naloxone (NARCAN) injection 0.4 mg  0.4 mg IntraVENous PRN    piperacillin-tazobactam (ZOSYN) 3.375 g in 0.9% sodium chloride (MBP/ADV) 100 mL MBP \"EXTENDED 4 HOUR INFUSION###\"   3.375 g IntraVENous Q12H           Mckay Belcher Nicole Mercer  Internal Medicine, Hospitalist  Pager: [phone] [facility]

## 2020-08-02 NOTE — PROGRESS NOTES
TRANSFER - OUT REPORT:    Verbal report given to Heidi Tovar RN(name) on Laureen Romero  being transferred to [place] for routine progression of care       Report consisted of patients Situation, Background, Assessment and   Recommendations(SBAR). Information from the following report(s) SBAR and Kardex was reviewed with the receiving nurse. Lines:   Peripheral IV 07/31/20 Left Antecubital (Active)   Site Assessment Clean, dry, & intact 08/01/20 2015   Phlebitis Assessment 0 08/01/20 2015   Infiltration Assessment 0 08/01/20 2015   Dressing Status Clean, dry, & intact 08/01/20 2015   Dressing Type Tape;Transparent 08/01/20 2015   Hub Color/Line Status Green; Infusing 08/01/20 2015   Action Taken Open ports on tubing capped 08/01/20 2015   Alcohol Cap Used Yes 08/01/20 2015        Opportunity for questions and clarification was provided.       Patient transported with:   Registered Nurse

## 2020-08-02 NOTE — ANESTHESIA PREPROCEDURE EVALUATION
Relevant Problems   No relevant active problems       Anesthetic History   No history of anesthetic complications            Review of Systems / Medical History  Patient summary reviewed and pertinent labs reviewed    Pulmonary  Within defined limits                 Neuro/Psych       CVA (Right hemiparesis)       Cardiovascular    Hypertension: well controlled          Past MI (7 Mis and 25 stents) and CAD    Exercise tolerance: <4 METS     GI/Hepatic/Renal     GERD: well controlled    Renal disease (Last HD- yesterday): ESRD and dialysis       Endo/Other    Diabetes: well controlled, type 2, using insulin         Other Findings              Physical Exam    Airway  Mallampati: II  TM Distance: 4 - 6 cm  Neck ROM: normal range of motion   Mouth opening: Normal     Cardiovascular  Regular rate and rhythm,  S1 and S2 normal,  no murmur, click, rub, or gallop             Dental      Comments: Several missing   Pulmonary  Breath sounds clear to auscultation               Abdominal  GI exam deferred       Other Findings            Anesthetic Plan    ASA: 4  Anesthesia type: MAC          Induction: Intravenous  Anesthetic plan and risks discussed with: Patient

## 2020-08-02 NOTE — PROGRESS NOTES
Received report from TARAH Wade in the PACU. Patient had the right great toe amputated with a 4x4, ace wrap to the extremity.

## 2020-08-02 NOTE — PROGRESS NOTES
Bedside shift change report given to this RN (oncoming nurse) by Rodolfo Holman RN (offgoing nurse). Report included the following information SBAR and Kardex.

## 2020-08-02 NOTE — PROGRESS NOTES
Problem: Discharge Planning Goal: *Discharge to safe environment Outcome: Resolved/Met Home with Home Health

## 2020-08-02 NOTE — PERIOP NOTES
TRANSFER - IN REPORT:    Verbal report received from  [facility]) on Brian Lightning  being received from 2200(unit) for ordered procedure      Report consisted of patients Situation, Background, Assessment and   Recommendations(SBAR). Information from the following report(s) Pre Procedure Checklist, Procedure Verification and Quality Measures was reviewed with the receiving nurse. Opportunity for questions and clarification was provided. Assessment completed upon patients arrival to unit and care assumed.

## 2020-08-03 VITALS
DIASTOLIC BLOOD PRESSURE: 91 MMHG | OXYGEN SATURATION: 98 % | WEIGHT: 203.26 LBS | BODY MASS INDEX: 28.46 KG/M2 | SYSTOLIC BLOOD PRESSURE: 149 MMHG | RESPIRATION RATE: 18 BRPM | HEIGHT: 71 IN | HEART RATE: 73 BPM | TEMPERATURE: 98.1 F

## 2020-08-03 LAB
ANION GAP SERPL CALC-SCNC: 10 MMOL/L (ref 3–18)
BUN SERPL-MCNC: 28 MG/DL (ref 7–18)
BUN/CREAT SERPL: 5 (ref 12–20)
CALCIUM SERPL-MCNC: 8.1 MG/DL (ref 8.5–10.1)
CHLORIDE SERPL-SCNC: 103 MMOL/L (ref 100–111)
CO2 SERPL-SCNC: 21 MMOL/L (ref 21–32)
CREAT SERPL-MCNC: 5.99 MG/DL (ref 0.6–1.3)
ERYTHROCYTE [DISTWIDTH] IN BLOOD BY AUTOMATED COUNT: 14.7 % (ref 11.6–14.5)
GLUCOSE BLD STRIP.AUTO-MCNC: 100 MG/DL (ref 70–110)
GLUCOSE BLD STRIP.AUTO-MCNC: 49 MG/DL (ref 70–110)
GLUCOSE BLD STRIP.AUTO-MCNC: 59 MG/DL (ref 70–110)
GLUCOSE BLD STRIP.AUTO-MCNC: 97 MG/DL (ref 70–110)
GLUCOSE SERPL-MCNC: 105 MG/DL (ref 74–99)
HBV SURFACE AB SER QL IA: POSITIVE
HBV SURFACE AB SERPL IA-ACNC: 968.12 MIU/ML
HCT VFR BLD AUTO: 30.1 % (ref 36–48)
HEP BS AB COMMENT,HBSAC: NORMAL
HGB BLD-MCNC: 9.9 G/DL (ref 13–16)
MCH RBC QN AUTO: 27.8 PG (ref 24–34)
MCHC RBC AUTO-ENTMCNC: 32.9 G/DL (ref 31–37)
MCV RBC AUTO: 84.6 FL (ref 74–97)
PLATELET # BLD AUTO: 235 K/UL (ref 135–420)
PMV BLD AUTO: 9.6 FL (ref 9.2–11.8)
POTASSIUM SERPL-SCNC: 5 MMOL/L (ref 3.5–5.5)
RBC # BLD AUTO: 3.56 M/UL (ref 4.7–5.5)
SODIUM SERPL-SCNC: 134 MMOL/L (ref 136–145)
WBC # BLD AUTO: 6.4 K/UL (ref 4.6–13.2)

## 2020-08-03 PROCEDURE — 74011250637 HC RX REV CODE- 250/637: Performed by: HOSPITALIST

## 2020-08-03 PROCEDURE — 82962 GLUCOSE BLOOD TEST: CPT

## 2020-08-03 PROCEDURE — 85027 COMPLETE CBC AUTOMATED: CPT

## 2020-08-03 PROCEDURE — 74011250636 HC RX REV CODE- 250/636: Performed by: HOSPITALIST

## 2020-08-03 PROCEDURE — 97162 PT EVAL MOD COMPLEX 30 MIN: CPT

## 2020-08-03 PROCEDURE — 36415 COLL VENOUS BLD VENIPUNCTURE: CPT

## 2020-08-03 PROCEDURE — 97116 GAIT TRAINING THERAPY: CPT

## 2020-08-03 PROCEDURE — 80048 BASIC METABOLIC PNL TOTAL CA: CPT

## 2020-08-03 PROCEDURE — 74011250637 HC RX REV CODE- 250/637: Performed by: PODIATRIST

## 2020-08-03 PROCEDURE — 74011000258 HC RX REV CODE- 258: Performed by: HOSPITALIST

## 2020-08-03 RX ORDER — INSULIN GLARGINE 100 [IU]/ML
5 INJECTION, SOLUTION SUBCUTANEOUS 2 TIMES DAILY
Qty: 5 VIAL | Refills: 11 | Status: SHIPPED
Start: 2020-08-03

## 2020-08-03 RX ORDER — HYDROMORPHONE HYDROCHLORIDE 1 MG/ML
0.5 INJECTION, SOLUTION INTRAMUSCULAR; INTRAVENOUS; SUBCUTANEOUS
Status: DISCONTINUED | OUTPATIENT
Start: 2020-08-03 | End: 2020-08-03 | Stop reason: HOSPADM

## 2020-08-03 RX ORDER — OXYCODONE AND ACETAMINOPHEN 5; 325 MG/1; MG/1
1-2 TABLET ORAL
Qty: 24 TAB | Refills: 0 | Status: SHIPPED | OUTPATIENT
Start: 2020-08-03 | End: 2020-08-06

## 2020-08-03 RX ORDER — DOXYCYCLINE 100 MG/1
100 TABLET ORAL 2 TIMES DAILY
Qty: 4 TAB | Refills: 0 | Status: SHIPPED | OUTPATIENT
Start: 2020-08-03 | End: 2020-08-05

## 2020-08-03 RX ORDER — NALOXONE HYDROCHLORIDE 4 MG/.1ML
SPRAY NASAL
Qty: 1 EACH | Refills: 0 | Status: SHIPPED | OUTPATIENT
Start: 2020-08-03

## 2020-08-03 RX ORDER — OXYCODONE AND ACETAMINOPHEN 5; 325 MG/1; MG/1
1-2 TABLET ORAL
Status: DISCONTINUED | OUTPATIENT
Start: 2020-08-03 | End: 2020-08-03 | Stop reason: HOSPADM

## 2020-08-03 RX ORDER — INSULIN GLARGINE 100 [IU]/ML
5 INJECTION, SOLUTION SUBCUTANEOUS 2 TIMES DAILY
Status: DISCONTINUED | OUTPATIENT
Start: 2020-08-03 | End: 2020-08-03 | Stop reason: HOSPADM

## 2020-08-03 RX ADMIN — HEPARIN SODIUM 5000 UNITS: 5000 INJECTION INTRAVENOUS; SUBCUTANEOUS at 11:17

## 2020-08-03 RX ADMIN — AMLODIPINE BESYLATE 10 MG: 10 TABLET ORAL at 11:16

## 2020-08-03 RX ADMIN — HEPARIN SODIUM 5000 UNITS: 5000 INJECTION INTRAVENOUS; SUBCUTANEOUS at 02:32

## 2020-08-03 RX ADMIN — HYDROMORPHONE HYDROCHLORIDE 0.5 MG: 1 INJECTION, SOLUTION INTRAMUSCULAR; INTRAVENOUS; SUBCUTANEOUS at 11:13

## 2020-08-03 RX ADMIN — HYDROMORPHONE HYDROCHLORIDE 0.5 MG: 1 INJECTION, SOLUTION INTRAMUSCULAR; INTRAVENOUS; SUBCUTANEOUS at 02:12

## 2020-08-03 RX ADMIN — PIPERACILLIN AND TAZOBACTAM 3.38 G: 3; .375 INJECTION, POWDER, LYOPHILIZED, FOR SOLUTION INTRAVENOUS at 02:12

## 2020-08-03 RX ADMIN — CHLORTHALIDONE 50 MG: 25 TABLET ORAL at 11:16

## 2020-08-03 RX ADMIN — CARVEDILOL 25 MG: 25 TABLET, FILM COATED ORAL at 08:00

## 2020-08-03 RX ADMIN — ASPIRIN 81 MG: 81 TABLET, COATED ORAL at 09:00

## 2020-08-03 RX ADMIN — OXYCODONE HYDROCHLORIDE AND ACETAMINOPHEN 2 TABLET: 5; 325 TABLET ORAL at 12:45

## 2020-08-03 RX ADMIN — CLOPIDOGREL BISULFATE 75 MG: 75 TABLET ORAL at 11:16

## 2020-08-03 RX ADMIN — ATORVASTATIN CALCIUM 80 MG: 80 TABLET, FILM COATED ORAL at 11:16

## 2020-08-03 RX ADMIN — HYDROCODONE BITARTRATE AND ACETAMINOPHEN 2 TABLET: 5; 325 TABLET ORAL at 07:24

## 2020-08-03 RX ADMIN — PREGABALIN 100 MG: 25 CAPSULE ORAL at 11:16

## 2020-08-03 NOTE — PROGRESS NOTES
Discharge/Transition Planning  Problem: Discharge Planning  Goal: *Discharge to safe environment  Outcome: Progressing Towards Goal   Home and likely HH    Great toe amputated yesterday for osteomyolitis. Patient has been no compliant with dialysis and podiatry appointments. I see note Podiatrist that after dsg change today or tomorrow- he may go home with po antibiotics for 7-10 days . If we could get those meds for pt from [facility], he he can pay for them- hopefully he may get them filled prior to dc. CM to follow for needs. Froilan Pagan.  Star Kimelor, BSN  [facility]  [phone], Pager [phone]  [email or web address]

## 2020-08-03 NOTE — PROGRESS NOTES
0730 Bedside and Verbal shift change report given to TARAH GRAVES (oncoming nurse) by Tarsha Wilkins (offgoing nurse). Report included the following information SBAR, Kardex, MAR and Recent Results. --took patient vs. medications administered, pt tolerated with ease, will continue to monitor. -- Shift reassessment, pt condition unchanged, will continue to monitor. -- Shift reassessment, pt condition unchanged, will continue to monitor. -- Bedside, Verbal and Written shift change report given to +++  (oncoming nurse) by Cleo Candelaria (offgoing nurse). Report included the following information SBAR, Kardex, MAR and Recent Results.

## 2020-08-03 NOTE — PROGRESS NOTES
conducted an initial consultation and Spiritual Assessment for Janes Bryant, who is a 48 y.o.,male. Patients Primary Language is: [place]. According to the patients EMR Jewish Affiliation is: [facility].     The reason the Patient came to the hospital is:   Patient Active Problem List    Diagnosis Date Noted    ESRD (end stage renal disease) ([facility] 07/31/2020    Infection of toe 07/31/2020    Osteomyelitis of great toe of right foot ([facility] 07/31/2020    HTN (hypertension) 07/31/2020    Uncontrolled type 1 diabetes mellitus ([facility] 11/03/2014    Hyperlipidemia 11/03/2014    CKD (chronic kidney disease) 11/03/2014    CAD (coronary artery disease) 11/03/2014    Diabetic neuropathy ([facility] 11/03/2014        The  provided the following Interventions:  Initiated a relationship of care and support. Explored issues of aubrey, spirituality and/or Adventism needs while hospitalized. Listened empathically. Provided chaplaincy education. Provided information about Spiritual Care Services. Offered prayer and assurance of continued prayers on patient's behalf. Chart reviewed. The following outcomes were achieved:  Patient shared some information about their medical narrative and spiritual journey/beliefs. Patient processed feeling about current hospitalization. Patient expressed gratitude for the 's visit. Assessment:  Patient did not indicate any spiritual or Adventism issues which require Spiritual Care Services interventions at this time. Patient does not have any Adventism/cultural needs that will affect patients preferences in health care. Plan:  Chaplains will continue to follow and will provide pastoral care on an as needed or requested basis.  recommends bedside caregivers page  on duty if patient shows signs of acute spiritual or emotional distress.     [address] [facility]   Staff [address] [facility]   [phone]

## 2020-08-03 NOTE — PROGRESS NOTES
Podiatry Progress Note    Assessment:     Jolene Jasso is a 48 y.o. male who present with POD#1 s/p clean margin amputatino of right hallux for         Plan:     Patient was examined bedside. Dressings: xeroform DSD  NWB to right foot, in offloading shoe, has shoe bedside, reiterated importance of compliance with shoe and NWB  Patient stable for DC from podiatry standpoint with PO abx. Follow up in the office with myself 1-2 weeks after DC. Call [phone] to schedule appt. Will need home health dressing changes 3x per week until sutures removed, about 2-3 weeks. Subjective:     Patient complains of some pain to the foot. He questions use of the shoe, says it is hard to walk, but says he will use it.     No Known Allergies    Current Facility-Administered Medications   Medication Dose Route Frequency    insulin glargine (LANTUS) injection 5 Units  5 Units SubCUTAneous BID    oxyCODONE-acetaminophen (PERCOCET) 5-325 mg per tablet 1-2 Tab  1-2 Tab Oral Q4H PRN    HYDROmorphone (DILAUDID) syringe 0.5 mg  0.5 mg IntraVENous Q4H PRN    dextrose 5 % - 0.45% NaCl infusion  75 mL/hr IntraVENous CONTINUOUS    sodium chloride (NS) flush 5-40 mL  5-40 mL IntraVENous Q8H    sodium chloride (NS) flush 5-40 mL  5-40 mL IntraVENous PRN    [START ON 8/4/2020] VANCOMYCIN INFORMATION NOTE   Other ONCE    amLODIPine (NORVASC) tablet 10 mg  10 mg Oral DAILY    aspirin delayed-release tablet 81 mg  81 mg Oral DAILY    atorvastatin (LIPITOR) tablet 80 mg  80 mg Oral DAILY    carvediloL (COREG) tablet 25 mg  25 mg Oral BID WITH MEALS    chlorthalidone (HYGROTEN) tablet 50 mg  50 mg Oral DAILY    clopidogreL (PLAVIX) tablet 75 mg  75 mg Oral DAILY    pregabalin (LYRICA) capsule 100 mg  100 mg Oral TID    sodium chloride (NS) flush 5-40 mL  5-40 mL IntraVENous Q8H    sodium chloride (NS) flush 5-40 mL  5-40 mL IntraVENous PRN    acetaminophen (TYLENOL) tablet 650 mg  650 mg Oral Q6H PRN    Or    acetaminophen (TYLENOL) suppository 650 mg  650 mg Rectal Q6H PRN    polyethylene glycol (MIRALAX) packet 17 g  17 g Oral DAILY PRN    promethazine (PHENERGAN) tablet 12.5 mg  12.5 mg Oral Q6H PRN    Or    ondansetron (ZOFRAN) injection 4 mg  4 mg IntraVENous Q6H PRN    heparin (porcine) injection 5,000 Units  5,000 Units SubCUTAneous Q8H    insulin lispro (HUMALOG) injection   SubCUTAneous AC&HS    glucose chewable tablet 16 g  4 Tab Oral PRN    glucagon (GLUCAGEN) injection 1 mg  1 mg IntraMUSCular PRN    VANCOMYCIN INFORMATION NOTE   Other Rx Dosing/Monitoring    naloxone (NARCAN) injection 0.4 mg  0.4 mg IntraVENous PRN    piperacillin-tazobactam (ZOSYN) 3.375 g in 0.9% sodium chloride (MBP/ADV) 100 mL MBP \"EXTENDED 4 HOUR INFUSION###\"   3.375 g IntraVENous Q12H       Past Medical History:   Diagnosis Date    CAD (coronary artery disease)     MI x3, 15 stents.  2008    Chronic kidney disease     Diabetes ([facility]     Dx in late 25s    GERD (gastroesophageal reflux disease)     Hypertension      Past Surgical History:   Procedure Laterality Date    CARDIAC SURG PROCEDURE UNLIST  2008    Stents (15)    HX ORTHOPAEDIC      left shoulder manipulation and bone spur removal     Family History   Problem Relation Age of Onset    Diabetes Mother     Diabetes Father      Social History     Socioeconomic History    Marital status: SINGLE     Spouse name: Not on file    Number of children: Not on file    Years of education: Not on file    Highest education level: Not on file   Occupational History    Not on file   Social Needs    Financial resource strain: Not on file    Food insecurity     Worry: Not on file     Inability: Not on file    Transportation needs     Medical: Not on file     Non-medical: Not on file   Tobacco Use    Smoking status: Current Some Day Smoker     Packs/day: 0.25     Years: 13.00     Pack years: 3.25    Smokeless tobacco: Never Used   Substance and Sexual Activity    Alcohol use: No    Drug use: No    Sexual activity: Yes     Partners: Female   Lifestyle    Physical activity     Days per week: Not on file     Minutes per session: Not on file    Stress: Not on file   Relationships    Social connections     Talks on phone: Not on file     Gets together: Not on file     Attends Buddhism service: Not on file     Active member of club or organization: Not on file     Attends meetings of clubs or organizations: Not on file     Relationship status: Not on file    Intimate partner violence     Fear of current or ex partner: Not on file     Emotionally abused: Not on file     Physically abused: Not on file     Forced sexual activity: Not on file   Other Topics Concern    Not on file   Social History Narrative    Not on file       Physical Exam:  General appearance: alert, cooperative, no distress, appears stated age  Head: Normocephalic, without obvious abnormality, atraumatic  Neck: supple, trachea midline  Lungs: clear to auscultation bilaterally  Heart: regular rate and rhythm, S1, S2 normal, no murmur, click, rub or gallop  Abdomen: soft, non-tender. Bowel sounds normal. No masses,  no organomegaly  Extremities: R large toe appears swollen , mild desquamation with area on lateral aspect of small ulceration and location of previous pus oozing. Skin: Skin color, texture, turgor normal. No rashes or lesions  Neurologic: Grossly normal        Vitals:    08/02/20 2043 08/03/20 0210 08/03/20 0801 08/03/20 1014   BP: 152/90 135/86 140/88    Pulse: 80 78 79    Resp: 16 16 16    Temp: 99.6 °F (37.6 °C) 98.3 °F (36.8 °C) 98.2 °F (36.8 °C)    TempSrc:       SpO2: 98% 96% 98%    Weight:    92.2 kg (203 lb 4.2 oz)   Height:           OBJECTIVE:    Patient is healthy and alert. Lower Extremity Exam:     Vascular: Dorsalis Pedis 2/4 and Posterior Tibial 1/4 Bilaterally. Pedal hair is   Decreased. There  are not varicosities present. Pedal edema is mild.     Neurological:  Light touch protective sensation is diminished to both feet. There are not non reproducible paresthesias to bilateral lower extremities. There are no Tinel's or 's signs present to the nerves crossing the ankle joint. Orthopedic:  No gross abnormalities present. Manual muscle testing is 5/5 right and 5/5 left for all groups traversing the ankles. Dermatological:   Hallux amputation site intact to right foot with skin coapted and sutures intact.  No drainage      Recent Results (from the past 24 hour(s))   GLUCOSE, POC    Collection Time: 08/02/20  4:00 PM   Result Value Ref Range    Glucose (POC) 111 (H) 70 - 110 mg/dL   GLUCOSE, POC    Collection Time: 08/02/20  9:41 PM   Result Value Ref Range    Glucose (POC) 46 (LL) 70 - 110 mg/dL   GLUCOSE, POC    Collection Time: 08/02/20 10:34 PM   Result Value Ref Range    Glucose (POC) 109 70 - 433 mg/dL   METABOLIC PANEL, BASIC    Collection Time: 08/03/20  3:25 AM   Result Value Ref Range    Sodium 134 (L) 136 - 145 mmol/L    Potassium 5.0 3.5 - 5.5 mmol/L    Chloride 103 100 - 111 mmol/L    CO2 21 21 - 32 mmol/L    Anion gap 10 3.0 - 18 mmol/L    Glucose 105 (H) 74 - 99 mg/dL    BUN 28 (H) 7.0 - 18 MG/DL    Creatinine 5.99 (H) 0.6 - 1.3 MG/DL    BUN/Creatinine ratio 5 (L) 12 - 20      GFR est AA 12 (L) >60 ml/min/1.73m2    GFR est non-AA 10 (L) >60 ml/min/1.73m2    Calcium 8.1 (L) 8.5 - 10.1 MG/DL   CBC W/O DIFF    Collection Time: 08/03/20  6:07 AM   Result Value Ref Range    WBC 6.4 4.6 - 13.2 K/uL    RBC 3.56 (L) 4.70 - 5.50 M/uL    HGB 9.9 (L) 13.0 - 16.0 g/dL    HCT 30.1 (L) 36.0 - 48.0 %    MCV 84.6 74.0 - 97.0 FL    MCH 27.8 24.0 - 34.0 PG    MCHC 32.9 31.0 - 37.0 g/dL    RDW 14.7 (H) 11.6 - 14.5 %    PLATELET 625 012 - 414 K/uL    MPV 9.6 9.2 - 11.8 FL   GLUCOSE, POC    Collection Time: 08/03/20  7:40 AM   Result Value Ref Range    Glucose (POC) 49 (LL) 70 - 110 mg/dL   GLUCOSE, POC    Collection Time: 08/03/20  8:09 AM   Result Value Ref Range    Glucose (POC) 59 (L) 70 - 110 mg/dL GLUCOSE, POC    Collection Time: 08/03/20  8:43 AM   Result Value Ref Range    Glucose (POC) 97 70 - 110 mg/dL   GLUCOSE, POC    Collection Time: 08/03/20 11:46 AM   Result Value Ref Range    Glucose (POC) 100 70 - 110 mg/dL         Blanka Chaudhary [facility]  [phone] [place]  [phone]

## 2020-08-03 NOTE — PROGRESS NOTES
Attempted to return patients sisters phone call (Johnathan Menjivar) at [phone]. Per patient it is okay to speak with his sister. No answer, left voicemail.

## 2020-08-03 NOTE — PROGRESS NOTES
0730 Bedside and Verbal shift change report given to TARAH GRAVES (oncoming nurse) by Cassidy Madison (offgoing nurse). Report included the following information SBAR, Kardex, MAR and Recent Results. patient c/o pain yet had  No Iv , stopped report to place new IV . Patient was cooperative at first, then made several complaints , stated \" I didn't want to com to this hospital this place isn't what it used to be.  
 
0930 GAVE REPORT TO Jennifer Jacobsen , patient had

## 2020-08-03 NOTE — ROUTINE PROCESS
1400 Patient for discharge today. Discharge instruction given. Pain prescription handed and advised to pick-up the rest of prescription at [facility]. Instructed to follow-up with PCP and Podiatry. Patient verbalized understanding. 1445 Patient insisted to be drop off at the Emergency for the pick-up ride. Refused to  be drop off at the front entrance and started to push the wheelchair by himself. Called Security to assist with Patient  and met security at the ED.

## 2020-08-03 NOTE — DISCHARGE SUMMARY
Internal Medicine Discharge Summary        Patient: Juan Diego Garcia    YOB: 1965    Age:  48 y. o. Admit Date: 7/31/2020    Discharge Date: 8/3/2020    LOS:  LOS: 3 days     Discharge To: Home    Consults: Podiatry    Admission Diagnoses: Osteomyelitis of great toe of right foot ([place] [id number].) [M86.9]    Discharge Diagnoses:    Problem List as of 8/3/2020 Date Reviewed: 11/3/2014          Codes Class Noted - Resolved    ESRD (end stage renal disease) ([place] [address].) ICD-10-CM: N18.6  ICD-9-CM: 585.6  7/31/2020 - Present        Infection of toe ICD-10-CM: L08.9  ICD-9-CM: 686.9  7/31/2020 - Present        * (Principal) Osteomyelitis of great toe of right foot ([place] [address].) ICD-10-CM: M86.9  ICD-9-CM: 730.27  7/31/2020 - Present        HTN (hypertension) ICD-10-CM: I10  ICD-9-CM: 401.9  7/31/2020 - Present        Uncontrolled type 1 diabetes mellitus ([place] [address].) ICD-10-CM: E10.65  ICD-9-CM: 250.03  11/3/2014 - Present        Hyperlipidemia ICD-10-CM: E78.5  ICD-9-CM: 272.4  11/3/2014 - Present        CKD (chronic kidney disease) ICD-10-CM: N18.9  ICD-9-CM: 585.9  11/3/2014 - Present        CAD (coronary artery disease) ICD-10-CM: I25.10  ICD-9-CM: 414.00  11/3/2014 - Present        Diabetic neuropathy ([place] [address].) ICD-10-CM: E11.40  ICD-9-CM: 250.60, 357.2  11/3/2014 - Present              Discharge Condition:  Improved    Procedures: AMPUTATION right great TOE - clean margin         HPI: Juan Diego Garcia is a 48y.o. year old male who presents with  Concern for R great toe infection. He states he has DM and hx of CVA with r sided deficit and therefore decreased sensation on that side. He said for last 3 weeks toe has been getting painful swollen and some pus has been eliccited. Patient also has ESRD on Dialysis TTS. In ed given vanc zosyn, requested blood cx. Xray and crp pending. Pus seen by ed doc and per their read there is destruction of distal phalynx .  He states last time seeing his podiatrist was 2 months ago    Hospital Course:    Osteomyelitis R great toe - Podiatry consulted. Taken to OR for amputation with clear margin. Treated with IVAB initially with transition to oral doxy to complete total of 3 days post-procedure. Pain meds Rx'd as well. Podiatry stated Dressings: xeroform DSD. NWB to right foot, in offloading shoe, has shoe bedside, reiterated importance of compliance with shoe and NWB. Recommended follow up with podiatry in 1-2 weeks after d/c. Home health dressing changes 3x per week until sutures removed in 2-3 weeks. ESRD on HD - Nephrology managed during inpatient. The rest of the patient's chronic conditions were managed appropriately during their admission. They were medically stable at the time of discharge.     Visit Vitals  BP (!) 149/91   Pulse 73   Temp 98.1 °F (36.7 °C)   Resp 18   Ht 5' 11\" (1.803 m)   Wt 92.2 kg (203 lb 4.2 oz)   SpO2 98%   BMI 28.35 kg/m²       Physical Exam at Discharge:  General Appearance: NAD, conversant  HENT: normocephalic/atraumatic, moist mucus membranes  Lungs: CTA with normal respiratory effort  CV: RRR, no m/r/g  Abdomen: soft, non-tender, normal bowel sounds  Extremities: no cyanosis, no peripheral edema, RLE in dressing  Neuro: moves all extremities, no focal deficits  Psych: appropriate affect, alert and oriented to person, place and time    Labs Prior to Discharge:  Labs: Results:       Chemistry Recent Labs     08/03/20  0325 08/02/20  0315 08/01/20  0320 07/31/20  1912   * 94 87 90   * 134* 134* 131*   K 5.0 4.0 4.0 4.4    101 101 101   CO2 21 22 23 21   BUN 28* 25* 49* 48*   CREA 5.99* 5.08* 7.64* 7.67*   CA 8.1* 8.0* 8.3* 8.8   AGAP 10 11 10 9   BUCR 5* 5* 6* 6*   AP  --   --   --  92   TP  --   --   --  9.5*   ALB  --   --   --  3.2*   GLOB  --   --   --  6.3*   AGRAT  --   --   --  0.5*      CBC w/Diff Recent Labs     08/03/20  0607 08/02/20  0315 08/01/20  0320 07/31/20 1912   WBC 6.4 7.3 7.5 8.3   RBC 3.56* 3.57* 3.82* 3.66* HGB 9.9* 9.8* 10.5* 10.2*   HCT 30.1* 29.9* 32.3* 30.5*    266 296 286   GRANS  --   --   --  76*   LYMPH  --   --   --  13*   EOS  --   --   --  3      Cardiac Enzymes Recent Labs     07/31/20 1912   *   CKND1 1.5      Coagulation No results for input(s): PTP, INR, APTT, INREXT in the last 72 hours. Lipid Panel Lab Results   Component Value Date/Time    Cholesterol, total 174 11/03/2014 02:45 PM    HDL Cholesterol 31 (L) 11/03/2014 02:45 PM    LDL, calculated 78 11/03/2014 02:45 PM    VLDL, calculated 65 (H) 11/03/2014 02:45 PM    Triglyceride 324 (H) 11/03/2014 02:45 PM      BNP No results for input(s): BNPP in the last 72 hours. Liver Enzymes Recent Labs     07/31/20 1912   TP 9.5*   ALB 3.2*   AP 92      Thyroid Studies No results found for: T4, T3U, TSH, TSHEXT         Significant Imaging:  Xr Foot Rt Min 3 V    Result Date: 8/1/2020  EXAM: RIGHT FOOT RADIOGRAPHS CLINICAL INDICATION/HISTORY: Pain, infected toe -Additional: None COMPARISON: None TECHNIQUE: 3 views of the right foot _______________ FINDINGS: Soft tissue ulceration is present involving the great toe distally with accompanying areas of osseous erosion and truncation involving the distal phalanx of the great toe. No evidence of superimposed acute fracture. No retained radiopaque foreign object. Degenerative changes noted throughout the midfoot as well as across the first MTP joint. IMPRESSION: Soft tissue ulcer and radiographic stigmata of osteomyelitis involving the great toe of the right foot. Discharge Medications:     Current Discharge Medication List      START taking these medications    Details   oxyCODONE-acetaminophen (PERCOCET) 5-325 mg per tablet Take 1-2 Tabs by mouth every four (4) hours as needed for Pain for up to 3 days. Max Daily Amount: 12 Tabs.   Qty: 24 Tab, Refills: 0    Associated Diagnoses: Osteomyelitis of great toe of right foot (HCC)      doxycycline (ADOXA) 100 mg tablet Take 1 Tab by mouth two (2) times a day for 2 days. Qty: 4 Tab, Refills: 0      naloxone (NARCAN) 4 mg/actuation nasal spray Use 1 spray intranasally, then discard. Repeat with new spray every 2 min as needed for opioid overdose symptoms, alternating nostrils. Qty: 1 Each, Refills: 0         CONTINUE these medications which have CHANGED    Details   insulin glargine (LANTUS) 100 unit/mL injection 5 Units by SubCUTAneous route two (2) times a day. Qty: 5 Vial, Refills: 11    Comments: Formulary in place of Levemir         CONTINUE these medications which have NOT CHANGED    Details   gabapentin (Neurontin) 100 mg capsule Take 100 mg by mouth three (3) times daily. clopidogreL (PLAVIX) 75 mg tab Take 1 Tab by mouth daily. Qty: 30 Tab, Refills: 3      aspirin delayed-release (Aspir-81) 81 mg tablet Take 1 Tab by mouth daily. Qty: 90 Tab, Refills: 1      pregabalin (Lyrica) 100 mg capsule TAKE 1 CAPSULE BY MOUTH THREE TIMES DAILY  Qty: 90 Cap, Refills: 5    Associated Diagnoses: Neuropathic pain      atorvastatin (LIPITOR) 80 mg tablet Take 1 Tab by mouth daily for 180 days. Qty: 90 Tab, Refills: 1      carvediloL (COREG) 25 mg tablet Take 1 Tab by mouth two (2) times daily (with meals). For blood pressure/heart. Replaces metoprolol  Qty: 60 Tab, Refills: 10      fenofibrate (LOFIBRA) 54 mg tablet Take 1 Tab by mouth daily. Qty: 90 Tab, Refills: 1      amLODIPine (Norvasc) 10 mg tablet Take 1 Tab by mouth daily. Qty: 90 Tab, Refills: 1      chlorthalidone (HYGROTEN) 25 mg tablet Take  by mouth daily. STOP taking these medications       insulin lispro (HumaLOG U-100 Insulin) 100 unit/mL injection Comments:   Reason for Stopping:               Activity: NWB to right foot, in offloading shoe, has shoe bedside, reiterated importance of compliance with shoe and NWB    Diet: Resume previous diet    Wound Care: Xeroform DSD.  Change dressing 3x per week until suture removed    Follow-up:   Please follow up with your PCP within 7 days to discuss your recent hospitalization. Patient to arrange.   Podiatry in 1-2 weeks         Total time spent including time spent on final examination and discharge discussion, discharge documentation and records reviewed and medication reconciliation: > 30 minutes    Tatianna Olmos DO  Internal Medicine, Hospitalist  Pager: [phone] [facility]

## 2020-08-03 NOTE — DISCHARGE INSTRUCTIONS
Patient armband removed and shredded    DISCHARGE SUMMARY from Nurse    Dressings: xeroform DSD  NWB to right foot, in offloading shoe, has shoe bedside, reiterated importance of compliance with shoe and NWB  Follow up in the office with myself 1-2 weeks after DC. Call [phone] to schedule appt. Will need home health dressing changes 3x per week until sutures removed, about 2-3 weeks.      PATIENT INSTRUCTIONS:    After general anesthesia or intravenous sedation, for 24 hours or while taking prescription Narcotics:  · Limit your activities  · Do not drive and operate hazardous machinery  · Do not make important personal or business decisions  · Do  not drink alcoholic beverages  · If you have not urinated within 8 hours after discharge, please contact your surgeon on call. Report the following to your surgeon:  · Excessive pain, swelling, redness or odor of or around the surgical area  · Temperature over 100.5  · Nausea and vomiting lasting longer than 4 hours or if unable to take medications  · Any signs of decreased circulation or nerve impairment to extremity: change in color, persistent  numbness, tingling, coldness or increase pain  · Any questions    What to do at Home:  Recommended activity: Activity as tolerated. If you experience any of the following symptoms chest pain, nausea, vomiting, swelling on the surgical site or significant bleeding, please follow up with primary care provider/ED. *  Please give a list of your current medications to your Primary Care Provider. *  Please update this list whenever your medications are discontinued, doses are      changed, or new medications (including over-the-counter products) are added. *  Please carry medication information at all times in case of emergency situations.     These are general instructions for a healthy lifestyle:    No smoking/ No tobacco products/ Avoid exposure to second hand smoke  Surgeon General's Warning:  Quitting smoking now greatly reduces serious risk to your health. Obesity, smoking, and sedentary lifestyle greatly increases your risk for illness    A healthy diet, regular physical exercise & weight monitoring are important for maintaining a healthy lifestyle    You may be retaining fluid if you have a history of heart failure or if you experience any of the following symptoms:  Weight gain of 3 pounds or more overnight or 5 pounds in a week, increased swelling in our hands or feet or shortness of breath while lying flat in bed. Please call your doctor as soon as you notice any of these symptoms; do not wait until your next office visit. The discharge information has been reviewed with the patient. The patient verbalized understanding. Discharge medications reviewed with the patient and appropriate educational materials and side effects teaching were provided. ___________________________________________________________________________________________________________________________________  MyChart Activation    Thank you for enrolling in [address] Please follow the instructions below to securely access your online medical record. [employer] allows you to send messages to your doctor, view your test results, renew your prescriptions, schedule appointments, and more. How Do I Sign Up? 1. In your internet browser, go to [email or web address]. 2. Click on the First Time User? Click Here link in the Sign In box. You will see the New Member Sign Up page. 3. Enter your [employer] Access Code exactly as it appears below. You will not need to use this code after youve completed the sign-up process. If you do not sign up before the expiration date, you must request a new code. [employer] Access Code: [email or web address]  Expires: 9/14/2020  8:18 PM     4. Enter the last four digits of your Social Security Number (xxxx) and Date of Birth (mm/dd/yyyy) as indicated and click Submit.  You will be taken to the next sign-up page. 5. Create a [employer] ID. This will be your [employer] login ID and cannot be changed, so think of one that is secure and easy to remember. 6. Create a [employer] password. You can change your password at any time. 7. Enter your Password Reset Question and Answer. This can be used at a later time if you forget your password. 8. Enter your e-mail address. You will receive e-mail notification when new information is available in [address] 9. Click Sign Up. You can now view your medical record. Additional Information    Remember, [employer] is NOT to be used for urgent needs. For medical emergencies, dial 911. Now available from your iPhone and Android! Patient Education        Learning About Foot and Toenail Care  Foot and toenail care: Overview  Checking your loved one's feet and keeping them clean and soft can help prevent cracks and infection in the skin. This is especially important for people who have diabetes. Keeping toenails trimmed--and polished if that's what the person likes--also helps the person feel well-groomed. If the person you care for has diabetes or has foot problems, such as bad bunions and corns, think about taking them to see a podiatrist. This is a doctor who specializes in the care of the feet. Sometimes a podiatrist will come to the home if the person can't go out for visits. Try to take the person for salon pedicures if that is what they want. It's a chance to get out and see people and continue a favorite activity. You can do basic nail care at home. Usually all you need to do is keep the nails clean and at a safe length. How do you trim someone's toenails? Try to trim the person's nails every week. Or check the nails each week to see if they need to be trimmed. It's easiest to trim nails after the person has had a shower or foot bath. It makes the nails softer and easier to trim. Start by gathering your supplies. You will need toenail clippers and a nail file.  You may also need nail polish and nail polish remover. To trim the nails:  1. Wash and dry your hands. You don't need to wear gloves. 2. Use nail polish remover to take off any polish. 3. Hold the person's foot and toe steady with one hand while you trim the nail with your other hand. Trim the nails straight across. Leave the nails a little longer at the corners so that the sharp ends don't cut into the skin. 4. Keep the nails no longer than the tip of the toes. 5. Let the nails dry if they are still damp and soft. 6. Use a nail file to gently smooth the edges of the nails, especially at the corners. They may be sharp after the nails are cut straight. 7. Apply nail polish, if the person wants it. If the person's nails are thick and discolored, it may be safest to have a podiatrist cut them. What else do you need to know? When you're caring for someone's nails, it is important to remember not to trim or cut the cuticles. A minor cut in a cuticle could lead to an infection. Wash the feet daily in the shower or bath or in a basin made for washing feet. It's extra important to wash the feet carefully if the person has diabetes. After washing the feet, dry gently. Put lotion on the feet, especially on the heels. But don't put it between the toes. If the person doesn't have diabetes and you see signs of athlete's foot (such as dry, cracking, or itchy skin between the toes), you can try an over-the-counter medicine. These medicines can kill the fungus that causes athlete's foot. If the problem doesn't go away, talk to the person's doctor. Look every day for cuts or signs of infection, such as pain, swelling, redness, or warmth. If you see any of these signs--especially in someone who has diabetes--call the doctor. Where can you learn more? Go to http://nato-kenneth.info/  Enter K326 in the search box to learn more about \"Learning About Foot and Toenail Care. \"  Current as of: December 9, 9802               GMAGPZP Version: 12.5  © 4682-0941 [employer]. Care instructions adapted under license by [employer] (which disclaims liability or warranty for this information). If you have questions about a medical condition or this instruction, always ask your healthcare professional. [address] any warranty or liability for your use of this information. Patient Education     Nutrition Tips for Diabetes: After Your Visit  Your Care Instructions  A healthy diet is important to manage diabetes. It helps you lose weight (if you need to) and keep it off. It gives you the nutrition and energy your body needs and helps prevent heart disease. But a diet for diabetes does not mean that you have to eat special foods. You can eat what your family eats, including occasional sweets and other favorites. But you do have to pay attention to how often you eat and how much you eat of certain foods. The right plan for you will give you meals that help you keep your blood sugar at healthy levels. Try to eat a variety of foods and to spread carbohydrate throughout the day. Carbohydrate raises blood sugar higher and more quickly than any other nutrient does. Carbohydrate is found in sugar, breads and cereals, fruit, starchy vegetables such as potatoes and corn, and milk and yogurt. You may want to work with a dietitian or diabetes educator to help you plan meals and snacks. A dietitian or diabetes educator also can help you lose weight if that is one of your goals. The following tips can help you enjoy your meals and stay healthy. Follow-up care is a key part of your treatment and safety. Be sure to make and go to all appointments, and call your doctor if you are having problems. Its also a good idea to know your test results and keep a list of the medicines you take. How can you care for yourself at home?   · Learn which foods have carbohydrate and how much carbohydrate to eat. A dietitian or diabetes educator can help you learn to keep track of how much carbohydrate you eat. · Spread carbohydrate throughout the day. Eat some carbohydrate at all meals, but do not eat too much at any one time. · Plan meals to include food from all the food groups. These are the food groups and some example portion sizes:  ¨ Grains: 1 slice of bread (1 ounce), ½ cup of cooked cereal, and 1/3 cup of cooked pasta or rice. These have about 15 grams of carbohydrate in a serving. Choose whole grains such as whole wheat bread or crackers, oatmeal, and brown rice more often than refined grains. ¨ Fruit: 1 small fresh fruit, such as an apple or orange; ½ of a banana; ½ cup of chopped, cooked, or canned fruit; ½ cup of fruit juice; 1 cup of melon or raspberries; and 2 tablespoons of dried fruit. These have about 15 grams of carbohydrate in a serving. ¨ Dairy: 1 cup of nonfat or low-fat milk and 2/3 cup of plain yogurt. These have about 15 grams of carbohydrate in a serving. ¨ Protein foods: Beef, chicken, turkey, fish, eggs, tofu, cheese, cottage cheese, and peanut butter. A serving size of meat is 3 ounces, which is about the size of a deck of cards. Examples of meat substitute serving sizes (equal to 1 ounce of meat) are 1/4 cup of cottage cheese, 1 egg, 1 tablespoon of peanut butter, and ½ cup of tofu. These have very little or no carbohydrate per serving. ¨ Vegetables: Starchy vegetables such as ½ cup of cooked dried beans, peas, potatoes, or corn have about 15 grams of carbohydrate. Nonstarchy vegetables have very little carbohydrate, such as 1 cup of raw leafy vegetables (such as spinach), ½ cup of other vegetables (cooked or chopped), and 3/4 cup of vegetable juice. · Use the plate format to plan meals. It is a good, quick way to make sure that you have a balanced meal. It also helps you spread carbohydrate throughout the day. You divide your plate by types of foods.  Put vegetables on half the plate, meat or meat substitutes on one-quarter of the plate, and a grain or starchy vegetable (such as brown rice or a potato) in the final quarter of the plate. To this you can add a small piece of fruit and 1 cup of milk or yogurt, depending on how much carbohydrate you are supposed to eat at a meal.  · Talk to your dietitian or diabetes educator about ways to add limited amounts of sweets into your meal plan. You can eat these foods now and then, as long as you include the amount of carbohydrate they have in your daily carbohydrate allowance. · If you drink alcohol, limit it to no more than 1 drink a day for women and 2 drinks a day for men. If you are pregnant, no amount of alcohol is known to be safe. · Protein, fat, and fiber do not raise blood sugar as much as carbohydrate does. If you eat a lot of these nutrients in a meal, your blood sugar will rise more slowly than it would otherwise. · Limit saturated fats, such as those from meat and dairy products. Try to replace it with monounsaturated fat, such as olive oil. This is a healthier choice because people who have diabetes are at higher-than-average risk of heart disease. But use a modest amount of olive oil. A tablespoon of olive oil has 14 grams of fat and 120 calories. · Exercise lowers blood sugar. If you take insulin by shots or pump, you can use less than you would if you were not exercising. Keep in mind that timing matters. If you exercise within 1 hour after a meal, your body may need less insulin for that meal than it would if you exercised 3 hours after the meal. Test your blood sugar to find out how exercise affects your need for insulin. · Exercise on most days of the week. Aim for at least 30 minutes. Exercise helps you stay at a healthy weight and helps your body use insulin. Walking is an easy way to get exercise. Gradually increase the amount you walk every day. You also may want to swim, bike, or do other activities.   When you eat out  · Learn to estimate the serving sizes of foods that have carbohydrate. If you measure food at home, it will be easier to estimate the amount in a serving of restaurant food. · If the meal you order has too much carbohydrate (such as potatoes, corn, or baked beans), ask to have a low-carbohydrate food instead. Ask for a salad or green vegetables. · If you use insulin, check your blood sugar before and after eating out to help you plan how much to eat in the future. · If you eat more carbohydrate at a meal than you had planned, take a walk or do other exercise. This will help lower your blood sugar. Where can you learn more? Go to [employer].be  Enter A615 in the search box to learn more about \"Nutrition Tips for Diabetes: After Your Visit. \"   © 5768-8188 [employer]. Care instructions adapted under license by [employer] (which disclaims liability or warranty for this information). This care instruction is for use with your licensed healthcare professional. If you have questions about a medical condition or this instruction, always ask your healthcare professional. [address] any warranty or liability for your use of this information. Content Version: 45.1.419787; Current as of: June 4, 2014                 Patient Education        Learning About Diabetes Food Guidelines  Your Care Instructions     Meal planning is important to manage diabetes. It helps keep your blood sugar at a target level (which you set with your doctor). You don't have to eat special foods. You can eat what your family eats, including sweets once in a while. But you do have to pay attention to how often you eat and how much you eat of certain foods. You may want to work with a dietitian or a certified diabetes educator (CDE) to help you plan meals and snacks. A dietitian or CDE can also help you lose weight if that is one of your goals.   What should you know about eating carbs? Managing the amount of carbohydrate (carbs) you eat is an important part of healthy meals when you have diabetes. Carbohydrate is found in many foods. · Learn which foods have carbs. And learn the amounts of carbs in different foods. ? Bread, cereal, pasta, and rice have about 15 grams of carbs in a serving. A serving is 1 slice of bread (1 ounce), ½ cup of cooked cereal, or 1/3 cup of cooked pasta or rice. ? Fruits have 15 grams of carbs in a serving. A serving is 1 small fresh fruit, such as an apple or orange; ½ of a banana; ½ cup of cooked or canned fruit; ½ cup of fruit juice; 1 cup of melon or raspberries; or 2 tablespoons of dried fruit. ? Milk and no-sugar-added yogurt have 15 grams of carbs in a serving. A serving is 1 cup of milk or 2/3 cup of no-sugar-added yogurt. ? Starchy vegetables have 15 grams of carbs in a serving. A serving is ½ cup of mashed potatoes or sweet potato; 1 cup winter squash; ½ of a small baked potato; ½ cup of cooked beans; or ½ cup cooked corn or green peas. · Learn how much carbs to eat each day and at each meal. A dietitian or CDE can teach you how to keep track of the amount of carbs you eat. This is called carbohydrate counting. · If you are not sure how to count carbohydrate grams, use the Plate Method to plan meals. It is a good, quick way to make sure that you have a balanced meal. It also helps you spread carbs throughout the day. ? Divide your plate by types of foods. Put non-starchy vegetables on half the plate, meat or other protein food on one-quarter of the plate, and a grain or starchy vegetable in the final quarter of the plate. To this you can add a small piece of fruit and 1 cup of milk or yogurt, depending on how many carbs you are supposed to eat at a meal.  · Try to eat about the same amount of carbs at each meal. Do not \"save up\" your daily allowance of carbs to eat at one meal.  · Proteins have very little or no carbs per serving. Examples of proteins are beef, chicken, turkey, fish, eggs, tofu, cheese, cottage cheese, and peanut butter. A serving size of meat is 3 ounces, which is about the size of a deck of cards. Examples of meat substitute serving sizes (equal to 1 ounce of meat) are 1/4 cup of cottage cheese, 1 egg, 1 tablespoon of peanut butter, and ½ cup of tofu. How can you eat out and still eat healthy? · Learn to estimate the serving sizes of foods that have carbohydrate. If you measure food at home, it will be easier to estimate the amount in a serving of restaurant food. · If the meal you order has too much carbohydrate (such as potatoes, corn, or baked beans), ask to have a low-carbohydrate food instead. Ask for a salad or green vegetables. · If you use insulin, check your blood sugar before and after eating out to help you plan how much to eat in the future. · If you eat more carbohydrate at a meal than you had planned, take a walk or do other exercise. This will help lower your blood sugar. What else should you know? · Limit saturated fat, such as the fat from meat and dairy products. This is a healthy choice because people who have diabetes are at higher risk of heart disease. So choose lean cuts of meat and nonfat or low-fat dairy products. Use olive or canola oil instead of butter or shortening when cooking. · Don't skip meals. Your blood sugar may drop too low if you skip meals and take insulin or certain medicines for diabetes. · Check with your doctor before you drink alcohol. Alcohol can cause your blood sugar to drop too low. Alcohol can also cause a bad reaction if you take certain diabetes medicines. Follow-up care is a key part of your treatment and safety. Be sure to make and go to all appointments, and call your doctor if you are having problems. It's also a good idea to know your test results and keep a list of the medicines you take. Where can you learn more?   Go to http://nato-kenneth.info/  Enter D063 in the search box to learn more about \"Learning About Diabetes Food Guidelines. \"  Current as of: December 20, 2019               Content Version: 12.5  © 1831-5461 [employer]. Care instructions adapted under license by [employer] (which disclaims liability or warranty for this information). If you have questions about a medical condition or this instruction, always ask your healthcare professional. [place] [address] any warranty or liability for your use of this information. Patient Education        Diabetes Foot Health: Care Instructions  Your Care Instructions     When you have diabetes, your feet need extra care and attention. Diabetes can damage the nerve endings and blood vessels in your feet, making you less likely to notice when your feet are injured. Diabetes also limits your body's ability to fight infection and get blood to areas that need it. If you get a minor foot injury, it could become an ulcer or a serious infection. With good foot care, you can prevent most of these problems. Caring for your feet can be quick and easy. Most of the care can be done when you are bathing or getting ready for bed. Follow-up care is a key part of your treatment and safety. Be sure to make and go to all appointments, and call your doctor if you are having problems. It's also a good idea to know your test results and keep a list of the medicines you take. How can you care for yourself at home? · Keep your blood sugar close to normal by watching what and how much you eat, monitoring blood sugar, taking medicines if prescribed, and getting regular exercise. · Do not smoke. Smoking affects blood flow and can make foot problems worse. If you need help quitting, talk to your doctor about stop-smoking programs and medicines. These can increase your chances of quitting for good. · Eat a diet that is low in fats. High fat intake can cause fat to build up in your blood vessels and decrease blood flow. · Inspect your feet daily for blisters, cuts, cracks, or sores. If you cannot see well, use a mirror or have someone help you. · Take care of your feet:  ? Wash your feet every day. Use warm (not hot) water. Check the water temperature with your wrists or other part of your body, not your feet. ? Dry your feet well. Pat them dry. Do not rub the skin on your feet too hard. Dry well between your toes. If the skin on your feet stays moist, bacteria or a fungus can grow, which can lead to infection. ? Keep your skin soft. Use moisturizing skin cream to keep the skin on your feet soft and prevent calluses and cracks. But do not put the cream between your toes, and stop using any cream that causes a rash. ? Clean underneath your toenails carefully. Do not use a sharp object to clean underneath your toenails. Use the blunt end of a nail file or other rounded tool. ? Trim and file your toenails straight across to prevent ingrown toenails. Use a nail clipper, not scissors. Use an emery board to smooth the edges. · Change socks daily. Socks without seams are best, because seams often rub the feet. You can find socks for people with diabetes from specialty catalogs. · Look inside your shoes every day for things like gravel or torn linings, which could cause blisters or sores. · Buy shoes that fit well:  ? Look for shoes that have plenty of space around the toes. This helps prevent bunions and blisters. ? Try on shoes while wearing the kind of socks you will usually wear with the shoes. ? Avoid plastic shoes. They may rub your feet and cause blisters. Good shoes should be made of materials that are flexible and breathable, such as leather or cloth. ? Break in new shoes slowly by wearing them for no more than an hour a day for several days.  Take extra time to check your feet for red areas, blisters, or other problems after you wear new shoes. · Do not go barefoot. Do not wear sandals, and do not wear shoes with very thin soles. Thin soles are easy to puncture. They also do not protect your feet from hot pavement or cold weather. · Have your doctor check your feet during each visit. If you have a foot problem, see your doctor. Do not try to treat an early foot problem at home. Home remedies or treatments that you can buy without a prescription (such as corn removers) can be harmful. · Always get early treatment for foot problems. A minor irritation can lead to a major problem if not properly cared for early. When should you call for help? Call your doctor now or seek immediate medical care if:  · You have a foot sore, an ulcer or break in the skin that is not healing after 4 days, bleeding corns or calluses, or an ingrown toenail. · You have blue or black areas, which can mean bruising or blood flow problems. · You have peeling skin or tiny blisters between your toes or cracking or oozing of the skin. · You have a fever for more than 24 hours and a foot sore. · You have new numbness or tingling in your feet that does not go away after you move your feet or change positions. · You have unexplained or unusual swelling of the foot or ankle. Watch closely for changes in your health, and be sure to contact your doctor if:  · You cannot do proper foot care. Where can you learn more? Go to http://www.gray.com/  Enter A739 in the search box to learn more about \"Diabetes Foot Health: Care Instructions. \"  Current as of: December 20, 2019               Content Version: 12.5  © 7335-1244 Healthwise, Incorporated. Care instructions adapted under license by [employer] (which disclaims liability or warranty for this information).  If you have questions about a medical condition or this instruction, always ask your healthcare professional. [address]  any warranty or liability for your use of this information. Patient Education        Diabetic Nephropathy: Care Instructions  Your Care Instructions     Finding out that your kidneys have been damaged can be very distressing. It may have taken you by surprise, since damage to kidneys usually does not cause symptoms early on. It is normal to feel upset and afraid. Having diabetic nephropathy means that for some time you have had high blood sugar, which damages the kidneys. Healthy kidneys keep protein in your blood, where it belongs. Damaged kidneys do not work the way they should. Your kidneys are letting protein pass into your urine. Sometimes diabetic kidney disease can lead to kidney failure. Your doctor will tell you how you might be able to slow damage to your kidneys. In many cases, prompt and regular treatment can prevent kidney failure. You will need to take medicine and may need to make a number of changes in your normal routines. If you can keep your blood sugar and blood pressure under control and take certain medicines, you may reduce your chance of kidney failure. Follow-up care is a key part of your treatment and safety. Be sure to make and go to all appointments, and call your doctor if you are having problems. It's also a good idea to know your test results and keep a list of the medicines you take. How can you care for yourself at home? · Take your medicines exactly as prescribed. It is very important that you take your insulin or other diabetes medicine as your doctor tells you. Call your doctor if you think you are having a problem with your medicine. · Try to keep blood sugar in your target range. ? Eat a variety of foods, with carbohydrate spread out in your meals. Your doctor may restrict your protein. A dietitian can help you plan meals. ? If your doctor recommends it, get more exercise. Walking is a good choice. Bit by bit, increase the amount you walk every day.  Try for at least 30 minutes on most days of the week. ? Check your blood sugar as often as your doctor recommends. · Take and record your blood pressure at home if your doctor tells you to. Learn the importance of the two measures of blood pressure (such as 130 over 80, or 130/80). To take your blood pressure at home:  ? Ask your doctor to check your blood pressure monitor. He or she can make sure that it is accurate and that the cuff fits you. Also ask your doctor to watch you to make sure that you are using it right. ? Do not eat, use tobacco products, or use medicine known to raise blood pressure (such as some nasal decongestant sprays) before taking your blood pressure. ? Avoid taking your blood pressure if you have just exercised or are nervous or upset. Rest at least 15 minutes before taking a reading. · Eat a low-salt diet to help keep your blood pressure in your target range. · Do not smoke. Smoking raises your risk of many health problems, including diabetic nephropathy. If you need help quitting, talk to your doctor about stop-smoking programs and medicines. These can increase your chances of quitting for good. · Do not take ibuprofen, naproxen, or similar medicines, unless your doctor tells you to. These medicines may make kidney problems worse. When should you call for help? [id number] anytime you think you may need emergency care. For example, call if:  · You passed out (lost consciousness). Call your doctor now or seek immediate medical care if:  · You have new or worse nausea and vomiting. · You have much less urine than normal, or you have no urine. · You are feeling confused or cannot think clearly. · You have new or more blood in your urine. · You have new swelling. · You are dizzy or lightheaded, or feel like you may faint. Watch closely for changes in your health, and be sure to contact your doctor if:  · You do not get better as expected. Where can you learn more?   Go to http://nato-kenneth.info/  Enter P361 in the search box to learn more about \"Diabetic Nephropathy: Care Instructions. \"  Current as of: August 12, 2019               Content Version: 12.5  © 6334-4620 Healthwise, Incorporated. Care instructions adapted under license by [employer] (which disclaims liability or warranty for this information). If you have questions about a medical condition or this instruction, always ask your healthcare professional. [place] [address] any warranty or liability for your use of this information.

## 2020-08-03 NOTE — PROGRESS NOTES
Problem: Mobility Impaired (Adult and Pediatric)  Goal: *Acute Goals and Plan of Care (Insert Text)  Description: Physical Therapy Goals  Initiated 8/3/2020 and to be accomplished within 7 day(s)  1. Patient will move from supine to sit and sit to supine  in bed with modified independence. 2.  Patient will transfer from bed to chair and chair to bed with modified independence using the least restrictive device. 3.  Patient will perform sit to stand with modified independence. 4.  Patient will ambulate with modified independence for 150 feet with the least restrictive device. 5.  Patient will ascend/descend 10 stairs with handrail(s) with modified independence. Outcome: Progressing Towards Goal   PHYSICAL THERAPY EVALUATION    Patient: Jarod Johnson (60 y.o. male)  Date: 8/3/2020  Primary Diagnosis: Osteomyelitis of great toe of right foot ([facility] [address] [M86.9]  Procedure(s) (LRB):  AMPUTATION right great TOE - clean margin  (Right) 1 Day Post-Op   Precautions: Fall  Heel WB RLE  PLOF: Independent  ASSESSMENT :  Educated on heel WB RLE s/p great toe amputation. Denies use of mobility device at home; reports prior right sided weakness from a CVA 3 years ago. Mod I for supine to sit. Donned surgical shoe with ortho wedge shoe to eliminate forefoot WB on RLE. Supervision for sit to stand. Amb 10ft with ww; request personal shoe donned on LLE to alleviate leg length discrepancy from surgical shoe. Supervision for sit to stand/stand to sit on chair with no armrests. Amb additional 40ft with ww. Returned to seated EOB with supervision. Poor safety awareness with mobility. Cues for slow down and attend to task and safety. Reports unable to walk in surgical shoe and request removal. Shoe removed reiterated need for heel WB on RLE. Agreeable to OOB in chair with BLE elevated. Amb 5ft from bed to chair with no AD and supervision. Educated on need for RN assistance with mobility; verbalized understanding. Call bell in reach. Patient will benefit from skilled intervention to address the above impairments. Patient's rehabilitation potential is considered to be Fair  Factors which may influence rehabilitation potential include:   []         None noted  []         Mental ability/status  [x]         Medical condition  []         Home/family situation and support systems  []         Safety awareness  []         Pain tolerance/management  []         Other:      PLAN :  Recommendations and Planned Interventions:   [x]           Bed Mobility Training             [x]    Neuromuscular Re-Education  [x]           Transfer Training                   []    Orthotic/Prosthetic Training  [x]           Gait Training                          []    Modalities  [x]           Therapeutic Exercises           []    Edema Management/Control  [x]           Therapeutic Activities            [x]    Family Training/Education  [x]           Patient Education  []           Other (comment):    Frequency/Duration: Patient will be followed by physical therapy 3-5 times a week to address goals. Discharge Recommendations: Home Health  Further Equipment Recommendations for Discharge: rolling walker     SUBJECTIVE:   Patient stated I don't want that.     OBJECTIVE DATA SUMMARY:     Past Medical History:   Diagnosis Date    CAD (coronary artery disease)     MI x3, 15 stents.  2008    Chronic kidney disease     Diabetes ([facility] [address]     Dx in late 25s    GERD (gastroesophageal reflux disease)     Hypertension      Past Surgical History:   Procedure Laterality Date    CARDIAC SURG PROCEDURE UNLIST  2008    Stents (15)    HX ORTHOPAEDIC      left shoulder manipulation and bone spur removal     Barriers to Learning/Limitations: None  Compensate with: Visual Cues, Verbal Cues, Tactile Cues and Kinesthetic Cues    Home Situation:  Home Situation  Home Environment: Apartment  # Steps to Enter: 12  One/Two Story Residence: Two story  # of Interior Steps: 0  Height of Each Step (in): 0 inches  Interior Rails: None  Lift Chair Available: No  Living Alone: No  Support Systems: Family member(s)  Patient Expects to be Discharged to[de-identified] Apartment  Current DME Used/Available at Home: Cane, quad    Critical Behavior:  Neurologic State: Alert  Orientation Level: Oriented X4  Cognition: Follows commands     Psychosocial  Patient Behaviors: Talkative    Strength:    Manual Muscle Testing (LE)         R     L    Hip Flexion:   4/5  5/5  Knee EXT:   4/5  5/5  Knee FLEX:   4/5  5/5  Ankle DF:     5/5  _________________________________________________   Range Of Motion:  BLE AROM WFL  Functional Mobility:  Bed Mobility:  Supine to Sit: Modified independent  Transfers:  Sit to Stand: Supervision  Stand to Sit: Supervision  Balance:   Sitting: Intact  Standing: Impaired  Standing - Static: Fair  Standing - Dynamic : Fair  Ambulation/Gait Training:  Distance (ft): 50 Feet (ft)   Assistive Device: Walker, rolling  Ambulation - Level of Assistance: Supervision  Therapeutic Exercises:   Sit to stand x3  Pain:  Pain level pre-treatment: 7.5/10   Pain level post-treatment: 8/10     Activity Tolerance:   Fair    After treatment:   [x]         Patient left in no apparent distress sitting up in chair  []         Patient left in no apparent distress in bed  [x]         Call bell left within reach  [x]         Nursing notified  []         Caregiver present  []         Bed alarm activated  []         SCDs applied    COMMUNICATION/EDUCATION:   [x]         Role of physical therapy and plan of care in the acute care setting. [x]         Fall prevention education was provided and the patient/caregiver indicated understanding. [x]         Patient/family have participated as able in goal setting and plan of care. []         Patient/family agree to work toward stated goals and plan of care. []         Patient understands intent and goals of therapy, but is neutral about his/her participation.   []         Patient is unable to participate in goal setting/plan of care: ongoing with therapy staff.     Thank you for this referral.  Jt Parnell, PT   Time Calculation: 13 mins    Eval Complexity: History: MEDIUM  Complexity : 1-2 comorbidities / personal factors will impact the outcome/ POC Exam:MEDIUM Complexity : 3 Standardized tests and measures addressing body structure, function, activity limitation and / or participation in recreation  Presentation: MEDIUM Complexity : Evolving with changing characteristics  Clinical Decision Making:Medium Complexity    Clinical judgement; ROM, MMT, functional mobility Overall Complexity:MEDIUM

## 2020-08-03 NOTE — PROGRESS NOTES
Problem: Pain  Goal: *Control of Pain  Outcome: Resolved/Met     Problem: Patient Education: Go to Patient Education Activity  Goal: Patient/Family Education  Outcome: Resolved/Met     Problem: Falls - Risk of  Goal: *Absence of Falls  Description: Document Silva Fall Risk and appropriate interventions in the flowsheet. Outcome: Resolved/Met     Problem: Patient Education: Go to Patient Education Activity  Goal: Patient/Family Education  Outcome: Resolved/Met     Problem: Nutrition Deficit  Goal: *Optimize nutritional status  Outcome: Resolved/Met     Problem: Pressure Injury - Risk of  Goal: *Prevention of pressure injury  Description: Document Jesus Scale and appropriate interventions in the flowsheet.   Outcome: Resolved/Met     Problem: Patient Education: Go to Patient Education Activity  Goal: Patient/Family Education  Outcome: Resolved/Met     Problem: Patient Education: Go to Patient Education Activity  Goal: Patient/Family Education  Outcome: Resolved/Met

## 2020-08-04 ENCOUNTER — PATIENT OUTREACH (OUTPATIENT)
Dept: CASE MANAGEMENT | Age: 55
End: 2020-08-04

## 2020-08-04 NOTE — PROGRESS NOTES
8/4/2020 10:40 AM  Call placed to patient at all listed numbers. Attempted to reach patient for hospital follow up and . No answer and there was no way to leave a message because home number had no way to leave a message and no other working number.

## 2020-08-06 LAB
BACTERIA SPEC CULT: NORMAL
BACTERIA SPEC CULT: NORMAL
GRAM STN SPEC: NORMAL
SERVICE CMNT-IMP: NORMAL
SERVICE CMNT-IMP: NORMAL

## 2020-08-06 NOTE — ADT AUTH CERT NOTES
Bill Keller Ayo T   
  
    
  
Good Afternoon, Date of Procedure: 8/2/2020  
   
Pre-Op Diagnosis: Ulcer of right foot, unspecified ulcer stage [facility] [L97.519], osteomyelitis Post-Op Diagnosis: Same as preoperative diagnosis.    
   
Procedure(s):  
AMPUTATION right great TOE - clean margin Thanks Sammi Mcrae

## 2020-08-07 ENCOUNTER — PATIENT OUTREACH (OUTPATIENT)
Dept: CASE MANAGEMENT | Age: 55
End: 2020-08-07

## 2020-08-07 LAB
BACTERIA SPEC CULT: NORMAL
BACTERIA SPEC CULT: NORMAL
SERVICE CMNT-IMP: NORMAL
SERVICE CMNT-IMP: NORMAL

## 2020-08-07 NOTE — PROGRESS NOTES
8/7/2020 12:57 PM  Call placed to patient at multiple numbers. Attempted to reach patient for Hospital follow up. No answer and there was no way to leave a message because no working number. Episode closed unable to reach.

## 2020-08-11 NOTE — PROCEDURES
Procedure Note    Patient: Jolene Jasso MRN: [id number]  SSN: [id number]    YOB: 1965  Age: 48 y.o. Sex: male    Patient: Jolene Jasso  YOB: 1965  MRN: [id number]     Date of Procedure: 8/2/2020      Pre-Op Diagnosis: Ulcer of right foot, unspecified ulcer stage [facility] [L97.519], osteomyelitis  Post-Op Diagnosis: Same as preoperative diagnosis.       Procedure(s):  AMPUTATION right great TOE - clean margin     Surgeon(s):  Celina Wahl DPM     Surgical Assistant: None     Anesthesia: MAC      Estimated Blood Loss (mL): less than 50      Complications: None     Specimens:   ID Type Source Tests Collected by Time Destination   1 : RIGHT BIG TOE Preservative     Celina Wahl DPM 8/2/2020 1206 Pathology   1 : RIGHT BIG TOE WOUND CULTURE FOR AEROBIC AND ANAEROBIC CULTURE Wound   CULTURE, AEROBIC AND ANAEROBIC Celina Wahl DPM 8/2/2020 1210 Microbiology         Implants: none     Drains: none     Findings: Good bleeding noted. No infection proximal to the 1st MPJ. Clean margin amputation, all infected bone and tissue removed     Plan: anticipate patient to be stable for DC from podiatric standpoint following dressing change, in 24-48hours with 7-10 days of PO abx. DESCRIPTION OF OPERATION:  The patient was brought back to the OR and the procedure was conducted in bed without transfer. A time-out was performed verifying the patient's information, surgical site, and procedure. IV sedation was used by anesthesiologist.  Local anesthesia was administered to the right foot using 20 mL of 0.25% Marcaine plain. The right foot was then prepped and draped in usual sterile manner. Attention was brought to the right foot . A racquet incision was made and drawn out with a 15 blade to the MPJ level of bone at the 1st digit. 1st digit was successfully disarticulated and tissue was sent as specimen to pathology and for cultures, gram stain, aerobic and anaerobic sensitivities.   Adequate bleeding was achieved at this point. Copious irrigation was done with sterile saline. deep Vicryl suture was utilized to re-approximate soft tissue bone void following application of deep suture, simple nylon sutures were used for skin re-approximation. Right foot was then dressed with Adaptic, 4 x 4, Kerlix, and Ace. The patient tolerated both procedure and anesthesia well. Vital signs stable throughout. The patient was transferred from OR to recovery room under the discretion of Anesthesia with neurovascular status intact in the operative limb. The patient will be readmitted to the floor for continued medical management and IV antibiotic.   Surgical plan for   the patient to be determined.       Sary Caba DPM

## (undated) DEVICE — INTENDED FOR TISSUE SEPARATION, AND OTHER PROCEDURES THAT REQUIRE A SHARP SURGICAL BLADE TO PUNCTURE OR CUT.: Brand: BARD-PARKER ® STAINLESS STEEL BLADES

## (undated) DEVICE — NEEDLE HYPO 25GA L1.5IN BVL ORIENTED ECLIPSE

## (undated) DEVICE — SPONGE GZ W4XL4IN COT 12 PLY TYP VII WVN C FLD DSGN

## (undated) DEVICE — SYR 10ML CTRL LR LCK NSAF LF --

## (undated) DEVICE — SUTURE ABSORBABLE BRAIDED 2-0 CT-1 27 IN UD VICRYL J259H

## (undated) DEVICE — SUTURE ETHLN SZ 2-0 L18IN NONABSORBABLE BLK L26MM PS 3/8 585H

## (undated) DEVICE — DRESSING,GAUZE,XEROFORM,CURAD,1"X8",ST: Brand: CURAD